# Patient Record
Sex: MALE | Race: WHITE | Employment: OTHER | URBAN - METROPOLITAN AREA
[De-identification: names, ages, dates, MRNs, and addresses within clinical notes are randomized per-mention and may not be internally consistent; named-entity substitution may affect disease eponyms.]

---

## 2018-05-07 ENCOUNTER — HOSPITAL ENCOUNTER (OUTPATIENT)
Dept: LAB | Age: 58
Discharge: HOME | End: 2018-05-07
Payer: MEDICARE

## 2018-05-07 ENCOUNTER — OFFICE VISIT (OUTPATIENT)
Dept: HEMATOLOGY | Age: 58
End: 2018-05-07

## 2018-05-07 VITALS
RESPIRATION RATE: 18 BRPM | HEIGHT: 70 IN | SYSTOLIC BLOOD PRESSURE: 144 MMHG | TEMPERATURE: 97.4 F | WEIGHT: 173 LBS | OXYGEN SATURATION: 93 % | DIASTOLIC BLOOD PRESSURE: 70 MMHG | BODY MASS INDEX: 24.77 KG/M2 | HEART RATE: 73 BPM

## 2018-05-07 DIAGNOSIS — B18.2 CHRONIC HEPATITIS C WITHOUT HEPATIC COMA (HCC): Primary | ICD-10-CM

## 2018-05-07 DIAGNOSIS — B18.2 CHRONIC HEPATITIS C WITHOUT HEPATIC COMA (HCC): ICD-10-CM

## 2018-05-07 PROBLEM — E11.9 TYPE II DIABETES MELLITUS (HCC): Status: ACTIVE | Noted: 2018-05-07

## 2018-05-07 PROBLEM — N18.6 ESRD (END STAGE RENAL DISEASE) ON DIALYSIS (HCC): Status: ACTIVE | Noted: 2018-05-07

## 2018-05-07 PROBLEM — I10 HYPERTENSION: Status: ACTIVE | Noted: 2018-05-07

## 2018-05-07 PROBLEM — Z99.2 ESRD (END STAGE RENAL DISEASE) ON DIALYSIS (HCC): Status: ACTIVE | Noted: 2018-05-07

## 2018-05-07 LAB
ALBUMIN SERPL-MCNC: 3.7 G/DL (ref 3.4–5)
ALBUMIN/GLOB SERPL: 1.1 {RATIO} (ref 0.8–1.7)
ALP SERPL-CCNC: 73 U/L (ref 45–117)
ALT SERPL-CCNC: 26 U/L (ref 16–61)
ANION GAP SERPL CALC-SCNC: 7 MMOL/L (ref 3–18)
AST SERPL-CCNC: 30 U/L (ref 15–37)
BASOPHILS # BLD: 0 K/UL (ref 0–0.06)
BASOPHILS NFR BLD: 1 % (ref 0–2)
BILIRUB DIRECT SERPL-MCNC: 0.1 MG/DL (ref 0–0.2)
BILIRUB SERPL-MCNC: 0.5 MG/DL (ref 0.2–1)
BUN SERPL-MCNC: 48 MG/DL (ref 7–18)
BUN/CREAT SERPL: 7 (ref 12–20)
CALCIUM SERPL-MCNC: 8.8 MG/DL (ref 8.5–10.1)
CHLORIDE SERPL-SCNC: 99 MMOL/L (ref 100–108)
CO2 SERPL-SCNC: 35 MMOL/L (ref 21–32)
CREAT SERPL-MCNC: 6.5 MG/DL (ref 0.6–1.3)
DIFFERENTIAL METHOD BLD: ABNORMAL
EOSINOPHIL # BLD: 0.4 K/UL (ref 0–0.4)
EOSINOPHIL NFR BLD: 7 % (ref 0–5)
ERYTHROCYTE [DISTWIDTH] IN BLOOD BY AUTOMATED COUNT: 15.4 % (ref 11.6–14.5)
FERRITIN SERPL-MCNC: 355 NG/ML (ref 8–388)
GLOBULIN SER CALC-MCNC: 3.4 G/DL (ref 2–4)
GLUCOSE SERPL-MCNC: 162 MG/DL (ref 74–99)
HCT VFR BLD AUTO: 35.7 % (ref 36–48)
HGB BLD-MCNC: 11.3 G/DL (ref 13–16)
INR PPP: 1 (ref 0.8–1.2)
IRON SATN MFR SERPL: 26 %
IRON SERPL-MCNC: 78 UG/DL (ref 50–175)
LYMPHOCYTES # BLD: 1.1 K/UL (ref 0.9–3.6)
LYMPHOCYTES NFR BLD: 19 % (ref 21–52)
MCH RBC QN AUTO: 30.7 PG (ref 24–34)
MCHC RBC AUTO-ENTMCNC: 31.7 G/DL (ref 31–37)
MCV RBC AUTO: 97 FL (ref 74–97)
MONOCYTES # BLD: 0.4 K/UL (ref 0.05–1.2)
MONOCYTES NFR BLD: 8 % (ref 3–10)
NEUTS SEG # BLD: 3.7 K/UL (ref 1.8–8)
NEUTS SEG NFR BLD: 65 % (ref 40–73)
PLATELET # BLD AUTO: 126 K/UL (ref 135–420)
PMV BLD AUTO: 9.5 FL (ref 9.2–11.8)
POTASSIUM SERPL-SCNC: 4.8 MMOL/L (ref 3.5–5.5)
PROT SERPL-MCNC: 7.1 G/DL (ref 6.4–8.2)
PROTHROMBIN TIME: 12.7 SEC (ref 11.5–15.2)
RBC # BLD AUTO: 3.68 M/UL (ref 4.7–5.5)
SODIUM SERPL-SCNC: 141 MMOL/L (ref 136–145)
TIBC SERPL-MCNC: 295 UG/DL (ref 250–450)
WBC # BLD AUTO: 5.7 K/UL (ref 4.6–13.2)

## 2018-05-07 PROCEDURE — 86708 HEPATITIS A ANTIBODY: CPT | Performed by: INTERNAL MEDICINE

## 2018-05-07 PROCEDURE — 87521 HEPATITIS C PROBE&RVRS TRNSC: CPT | Performed by: INTERNAL MEDICINE

## 2018-05-07 PROCEDURE — 80048 BASIC METABOLIC PNL TOTAL CA: CPT | Performed by: INTERNAL MEDICINE

## 2018-05-07 PROCEDURE — 83540 ASSAY OF IRON: CPT | Performed by: INTERNAL MEDICINE

## 2018-05-07 PROCEDURE — 36415 COLL VENOUS BLD VENIPUNCTURE: CPT | Performed by: INTERNAL MEDICINE

## 2018-05-07 PROCEDURE — 85025 COMPLETE CBC W/AUTO DIFF WBC: CPT | Performed by: INTERNAL MEDICINE

## 2018-05-07 PROCEDURE — 82728 ASSAY OF FERRITIN: CPT | Performed by: INTERNAL MEDICINE

## 2018-05-07 PROCEDURE — 80076 HEPATIC FUNCTION PANEL: CPT | Performed by: INTERNAL MEDICINE

## 2018-05-07 PROCEDURE — 87902 NFCT AGT GNTYP ALYS HEP C: CPT | Performed by: INTERNAL MEDICINE

## 2018-05-07 PROCEDURE — 85610 PROTHROMBIN TIME: CPT | Performed by: INTERNAL MEDICINE

## 2018-05-07 RX ORDER — GUAIFENESIN 100 MG/5ML
81 LIQUID (ML) ORAL DAILY
COMMUNITY
End: 2019-08-12

## 2018-05-07 RX ORDER — SEVELAMER CARBONATE 800 MG/1
TABLET, FILM COATED ORAL 3 TIMES DAILY
COMMUNITY
End: 2021-02-03

## 2018-05-07 RX ORDER — AMLODIPINE BESYLATE 5 MG/1
5 TABLET ORAL DAILY
COMMUNITY
End: 2021-02-03

## 2018-05-07 RX ORDER — ROSUVASTATIN CALCIUM 5 MG/1
TABLET, COATED ORAL
COMMUNITY
End: 2019-08-12

## 2018-05-07 RX ORDER — CARVEDILOL 25 MG/1
25 TABLET ORAL 2 TIMES DAILY WITH MEALS
COMMUNITY

## 2018-05-07 RX ORDER — CLONIDINE HYDROCHLORIDE 0.2 MG/1
TABLET ORAL 2 TIMES DAILY
COMMUNITY
End: 2021-02-03

## 2018-05-07 RX ORDER — FUROSEMIDE 40 MG/1
40 TABLET ORAL
COMMUNITY

## 2018-05-07 RX ORDER — LOSARTAN POTASSIUM 100 MG/1
50 TABLET ORAL DAILY
COMMUNITY
End: 2021-02-03

## 2018-05-07 RX ORDER — HYDROXYZINE PAMOATE 25 MG/1
25 CAPSULE ORAL
COMMUNITY
End: 2019-08-12

## 2018-05-07 NOTE — PROGRESS NOTES
Sarita Aguilar is a 59 y.o. male      1. Have you been to the ER, urgent care clinic or hospitalized since your last visit? NO.     2. Have you seen or consulted any other health care providers outside of the [address] [place] [address] since your last visit (Include any pap smears or colon screening)?  NO          Learning Assessment 5/7/2018   PRIMARY LEARNER Patient   BARRIERS PRIMARY LEARNER NONE   CO-LEARNER CAREGIVER No   PRIMARY LANGUAGE ENGLISH   LEARNER PREFERENCE PRIMARY LISTENING   ANSWERED BY PATIENT   RELATIONSHIP SELF

## 2018-05-07 NOTE — MR AVS SNAPSHOT
[address] [address] 
[phone] Patient: Florina Osler MRN: [id number] SIO:9/7/8972 Visit Information Date & Time Provider Department Dept. Phone Encounter #  
 5/7/2018 10:00 AM Jamila Muhammad MD [address] [place] [address] of  [facility] [id number] Upcoming Health Maintenance Date Due Hepatitis C Screening 1960 DTaP/Tdap/Td series (1 - Tdap) 1/4/1979 FOBT Q 1 YEAR AGE 50-75 1/4/2008 ZOSTER VACCINE AGE 60> 11/4/2017 Influenza Age 5 to Adult 8/1/2018 Allergies as of 5/7/2018  Review Complete On: 5/7/2018 By: Libby Solis No Known Allergies Current Immunizations  Never Reviewed No immunizations on file. Not reviewed this visit You Were Diagnosed With   
  
 Codes Comments Chronic hepatitis C without hepatic coma (HCC)    -  Primary ICD-10-CM: B18.2 ICD-9-CM: 070.54 Vitals BP Pulse Temp Resp Height(growth percentile) 144/70 (BP 1 Location: Right arm, BP Patient Position: Sitting) 73 97.4 °F (36.3 °C) (Tympanic) 18 5' 10\" (1.778 m) Weight(growth percentile) SpO2 BMI Smoking Status 173 lb (78.5 kg) 93% 24.82 kg/m2 Current Every Day Smoker BMI and BSA Data Body Mass Index Body Surface Area  
 24.82 kg/m 2 1.97 m 2 Preferred Pharmacy Pharmacy Name Phone [phone] Your Updated Medication List  
  
   
This list is accurate as of 5/7/18 11:03 AM.  Always use your most recent med list. amLODIPine 5 mg tablet Commonly known as:  Ilda Pall Take 5 mg by mouth daily. aspirin 81 mg chewable tablet Take 81 mg by mouth daily. carvedilol 25 mg tablet Commonly known as:  Carroll Staufferch Take 25 mg by mouth two (2) times daily (with meals). cloNIDine HCl 0.2 mg tablet Commonly known as:  CATAPRES  
 Take  by mouth two (2) times a day. DIALYVITE 800 WITH ZINC 15 PO Take  by mouth. hydrOXYzine pamoate 25 mg capsule Commonly known as:  VISTARIL Take 25 mg by mouth three (3) times daily as needed for Itching. JANUVIA 50 mg tablet Generic drug:  SITagliptin Take 50 mg by mouth daily. LASIX 40 mg tablet Generic drug:  furosemide Take  by mouth daily. losartan 100 mg tablet Commonly known as:  COZAAR Take 100 mg by mouth daily. RENVELA 800 mg Tab tab Generic drug:  sevelamer carbonate Take  by mouth three (3) times daily. rosuvastatin 5 mg tablet Commonly known as:  CRESTOR Take  by mouth nightly. To-Do List   
 05/07/2018 Lab:  CBC WITH AUTOMATED DIFF   
  
 05/07/2018 Lab:  FERRITIN   
  
 05/07/2018 Lab:  HCV RNA BY PARI QL,RFLX TO QT   
  
 05/07/2018 Lab:  HEP A AB, TOTAL   
  
 05/07/2018 Lab:  HEP C GENOTYPE   
  
 05/07/2018 Lab:  HEPATIC FUNCTION PANEL   
  
 05/07/2018 Lab:  IRON PROFILE   
  
 05/07/2018 Lab:  METABOLIC PANEL, BASIC   
  
 05/07/2018 Lab:  PROTHROMBIN TIME + INR   
  
 05/07/2018 Imaging:  US ABD LTD W ELASTOGRAPHY Introducing [facility] & HEALTH SERVICES! [employer] introduces [employer]-Exchange patient portal. Now you can access parts of your medical record, email your doctor's office, and request medication refills online. 1. In your internet browser, go to [email or web address]. [employer] 2. Click on the First Time User? Click Here link in the Sign In box. You will see the New Member Sign Up page. 3. Enter your Art-Exchange Access Code exactly as it appears below. You will not need to use this code after youve completed the sign-up process. If you do not sign up before the expiration date, you must request a new code. · Art-Exchange Access Code: [email or web address] Expires: 8/5/2018 11:03 AM 
 
4.  Enter the last four digits of your Social Security Number (xxxx) and Date of Birth (mm/dd/yyyy) as indicated and click Submit. You will be taken to the next sign-up page. 5. Create a Wellframe ID. This will be your Wellframe login ID and cannot be changed, so think of one that is secure and easy to remember. 6. Create a Wellframe password. You can change your password at any time. 7. Enter your Password Reset Question and Answer. This can be used at a later time if you forget your password. 8. Enter your e-mail address. You will receive e-mail notification when new information is available in [address] 9. Click Sign Up. You can now view and download portions of your medical record. 10. Click the Download Summary menu link to download a portable copy of your medical information. If you have questions, please visit the Frequently Asked Questions section of the Wellframe website. Remember, Wellframe is NOT to be used for urgent needs. For medical emergencies, dial 911. Now available from your iPhone and Android! Please provide this summary of care documentation to your next provider. Your primary care clinician is listed as Radha Lo. If you have any questions after today's visit, please call [phone].

## 2018-05-08 LAB — HAV AB SER QL IA: NEGATIVE

## 2018-05-10 LAB
HCV GENTYP SERPL NAA+PROBE: NORMAL
PLEASE NOTE, 550474: NORMAL

## 2018-05-11 LAB
HCV RNA SERPL NAA+PROBE-LOG IU: 7.69 HCV LOG 10IU/ML
HCV RNA SERPL PROBE AMP-ACNC: ABNORMAL HCVIU/ML
HCV RNA SERPL QL NAA+PROBE: POSITIVE

## 2018-06-25 ENCOUNTER — OFFICE VISIT (OUTPATIENT)
Dept: HEMATOLOGY | Age: 58
End: 2018-06-25

## 2018-06-25 VITALS
DIASTOLIC BLOOD PRESSURE: 82 MMHG | RESPIRATION RATE: 18 BRPM | HEART RATE: 73 BPM | SYSTOLIC BLOOD PRESSURE: 156 MMHG | TEMPERATURE: 96.8 F | HEIGHT: 70 IN | OXYGEN SATURATION: 90 %

## 2018-06-25 DIAGNOSIS — B18.2 CHRONIC HEPATITIS C WITHOUT HEPATIC COMA (HCC): Primary | ICD-10-CM

## 2018-06-25 NOTE — PROGRESS NOTES
[address] [address], [place], [address], [place], [place]       Sarah Bean, TAMMI Jin, AFSANEH Barr, [facility]   TAMMI Lr, TAMMI Poole [facility] [address] [address]    at [address], [address] Bridgett Srivastava  22.    [phone]    FAX: [address] [place] [address], [id number] [place] [address], [address]    [phone]    FAX: [phone]       Patient Care Team:  Eric Rojas MD as PCP - General (Family Practice)  Beti Howell MD (Internal Medicine)      Problem List  Date Reviewed: 5/13/2018          Codes Class Noted    Chronic hepatitis C [facility]) ICD-10-CM: B18.2  ICD-9-CM: 070.54  5/7/2018        ESRD (end stage renal disease) on dialysis [facility]) ICD-10-CM: N18.6, Z99.2  ICD-9-CM: 585.6, V45.11  5/7/2018        Type II diabetes mellitus ([facility] ICD-10-CM: E11.9  ICD-9-CM: 250.00  5/7/2018        Hypertension ICD-10-CM: I10  ICD-9-CM: 401.9  5/7/2018              Deborah Castro returns to the [address] for management of cirrhosis secondary to chronic HCV. The active problem list, all pertinent past medical history, medications, radiologic findings and laboratory findings related to the liver disorder were reviewed with the patient. The patient is a 59 y.o.  male who tested positive for chronic HCV in 2017 when he started dialysis      Since the last office appointment the patient has:  Been hospitalized twice for recurrent pleural effusion. An assessment of liver fibrosis with biopsy or elastography has not been performed. The patient has never received treatment for chronic HCV.       The patient notes fatigue,     The patient has not experienced pain in the right side over the liver,     The patient completes all daily activities without any functional limitations. ASSESSMENT AND PLAN:  Chronic HCV   Chronic HCV of unclear severity. Severity of the liver disease was assessed by laboratory studies  Liver transaminases are normal.  ALP is normal.  Liver function is normal.  The platelet count is depressed. Based upon laboratory studies the patient may have advanced liver disease or cirrhosis. The patient has not been treated for HCV. The patient has HCV genotype 2. Discussed the treatment alternatives. The SVR/cure rate for HCV now exceeds 97% without significant side effects for most patients with HCV. The specific treatment is dependent upon genotype, viral load and histology. The patient should be treated with Satira Edie (glecaprevir and piprentasvir) because of CKD. HOwever, if the patient is a potential renal transplant candidate he should undergo renal translant with an HCV positive kidney and then have HCV treated after the renal transplant. After the transplant he can be treated with Epclusa or Satira Edie. Recurrent pleural effusion  Unclear if this is from cirrhosis, fluid overload from ESRD or cirrhosis    Anemia   This is due to ESRD on dialysis    Thrombocytopenia   This is of unclear etiology. This may be secondary to cirrhosis. There is no evidence of overt bleeding. No treatment is required. The platelet count is adequate for the patient to undergo procedures without the need for platelet transfusion or platelet growth factors. Screening for Hepatocellular Carcinoma  HCC screening has recently been performed and does not suggest [facility] The next liver imaging study will be performed in 6/2018. Treatment of other medical problems in patients with chronic liver disease  There are no contraindications for the patient to take any medications that are necessary for treatment of other medical issues.     Counseling for alcohol in patients with chronic liver disease  The patient was counseled regarding alcohol consumption and the effect of alcohol on chronic liver disease. Vaccinations   Vaccination for viral hepatitis A is recommended since the patient has no serologic evidence of previous exposure or vaccination with immunity. Vaccination for viral hepatitis B is not needed. The patient has serologic evidence of prior exposure or vaccination with immunity. Routine vaccinations against other bacterial and viral agents can be performed as indicated. Annual flu vaccination should be administered if indicated. ALLERGIES  No Known Allergies    MEDICATIONS  Current Outpatient Prescriptions   Medication Sig    amLODIPine (NORVASC) 5 mg tablet Take 5 mg by mouth daily.  aspirin 81 mg chewable tablet Take 81 mg by mouth daily.  carvedilol (COREG) 25 mg tablet Take 25 mg by mouth two (2) times daily (with meals).  cloNIDine HCl (CATAPRES) 0.2 mg tablet Take  by mouth two (2) times a day.  vit B complx C/folic acid/zinc (DIALYVITE 800 WITH ZINC 15 PO) Take  by mouth.  furosemide (LASIX) 40 mg tablet Take  by mouth daily. Indications: three 40 mg tabs per day.  hydrOXYzine pamoate (VISTARIL) 25 mg capsule Take 25 mg by mouth three (3) times daily as needed for Itching.  losartan (COZAAR) 100 mg tablet Take 50 mg by mouth daily.  sevelamer carbonate (RENVELA) 800 mg tab tab Take  by mouth three (3) times daily.  rosuvastatin (CRESTOR) 5 mg tablet Take  by mouth nightly.  SITagliptin (JANUVIA) 50 mg tablet Take 25 mg by mouth daily. No current facility-administered medications for this visit. SYSTEM REVIEW NOT RELATED TO LIVER DISEASE OR REVIEWED ABOVE:  Constitution systems: Negative for fever, chills, weight gain, weight loss. Eyes: Negative for visual changes. ENT: Negative for sore throat, painful swallowing. Respiratory: Negative for cough, hemoptysis, SOB. Cardiology: Negative for chest pain, palpitations.   GI: Negative for constipation or diarrhea. : Negative for urinary frequency, dysuria, hematuria, nocturia. Skin: Negative for rash. Hematology: Negative for easy bruising, blood clots. Musculo-skelatal: Negative for back pain, muscle pain, weakness. Neurologic: Negative for headaches, dizziness, vertigo, memory problems not related to HE. Psychology: Negative for anxiety, depression. FAMILY HISTORY:  The father  of lung cancer. The mother  of head and neck cancer. A brother has cirrhosis form HCV and alcohol. SOCIAL HISTORY:  The patient is . The spouse has not been tested for HCV   The patient has 2 stepchildren, and 3 grandchildren. The patient stopped smoking tobacco in 2018. The patient has previously consumed alcohol in excess - up to 1/5 of alcohol per day. The patient has been abstinent from alcohol since 2017. The patient used to work as a . The patient has not worked since . The patient is currently receiving disability. PHYSICAL EXAMINATION:  Visit Vitals    /82 (BP 1 Location: Right arm, BP Patient Position: Sitting)    Pulse 73    Temp 96.8 °F (36 °C) (Tympanic)    Resp 18    Ht 5' 10\" (1.778 m)    SpO2 90%     General: No acute distress. Eyes: Sclera anicteric. ENT: No oral lesions. Thyroid normal.  Nodes: No adenopathy. Skin: No spider angiomata. No jaundice. No palmar erythema. Respiratory: Lungs clear to auscultation. Cardiovascular: Regular heart rate. No murmurs. No JVD. Abdomen: Soft non-tender. Liver size normal to percussion/palpation. Spleen not palpable. No obvious ascites. Extremities: No edema. No muscle wasting. No gross arthritic changes. Neurologic: Alert and oriented. Cranial nerves grossly intact. No asterixis.     LABORATORY STUDIES:  Liver [place] of [address] [place] [address] & Units 2018   WBC 4.6 - 13.2 K/uL 5.7   ANC 1.8 - 8.0 K/UL 3.7   HGB 13.0 - 16.0 g/dL 11.3 (L)    - 420 K/uL 126 (L)   INR 0.8 - 1.2   1.0   AST 15 - 37 U/L 30   ALT 16 - 61 U/L 26   Alk Phos 45 - 117 U/L 73   Bili, Total 0.2 - 1.0 MG/DL 0.5   Bili, Direct 0.0 - 0.2 MG/DL 0.1   Albumin 3.4 - 5.0 g/dL 3.7   BUN 7.0 - 18 MG/DL 48 (H)   Creat 0.6 - 1.3 MG/DL 6.50 (H)   Na 136 - 145 mmol/L 141   K 3.5 - 5.5 mmol/L 4.8   Cl 100 - 108 mmol/L 99 (L)   CO2 21 - 32 mmol/L 35 (H)   Glucose 74 - 99 mg/dL 162 (H)     SEROLOGIES:  2017. HBsurface antigen negative, anti-HBcore negative, anti-HBsurface positive    Serologies Latest Ref Rng & Units 2018   Hep A Ab, Total NEGATIVE   NEGATIVE   Hep C Genotype  2b   Ferritin 8 - 388 NG/   Iron % Saturation % 26     2018. HCV RNA Log 7.7 int U    LIVER HISTOLOGY:  Not available or performed    ENDOSCOPIC PROCEDURES:  Not available or performed    RADIOLOGY:  Not available or performed    OTHER TESTIN2017. ECHO heart. LVEF 60%. No TR. No PulHTN. Diastolic dysfunction. FOLLOW-UP:  All of the issues listed above in the Assessment and Plan were discussed with the patient. All questions were answered. The patient expressed a clear understanding of the above. [facility] in 4 weeks for elastography to review all data and determine the treatment plan.     Wen Salas MD  [address]  4 [facility], [address] [place] [address], [address]  [facility]

## 2018-06-25 NOTE — MR AVS SNAPSHOT
[address] [address] 
[phone] Patient: Jaylin Urbina MRN: [id number] [facility]:[birth year] Visit Information Date & Time Provider Department Dept. Phone Encounter #  
 6/25/2018 11:15 AM MD Ricki Herrera 13 of [facility] [phone] Upcoming Health Maintenance Date Due HEMOGLOBIN A1C Q6M 1960 LIPID PANEL Q1 1960 FOOT EXAM Q1 1/4/1968 MICROALBUMIN Q1 1/4/1968 EYE EXAM RETINAL OR DILATED Q1 1/4/1968 Pneumococcal 19-64 Highest Risk (1 of 3 - PCV13) 1/4/1977 DTaP/Tdap/Td series (1 - Tdap) 1/4/1979 FOBT Q 1 YEAR AGE 50-75 1/4/2008 ZOSTER VACCINE AGE 60> 11/4/2017 MEDICARE YEARLY EXAM 5/7/2018 Influenza Age 5 to Adult 8/1/2018 Allergies as of 6/25/2018  Review Complete On: 6/25/2018 By: Paulina Broussard No Known Allergies Current Immunizations  Never Reviewed No immunizations on file. Not reviewed this visit Vitals BP Pulse Temp Resp Height(growth percentile) SpO2  
 156/82 (BP 1 Location: Right arm, BP Patient Position: Sitting) 73 96.8 °F (36 °C) (Tympanic) 18 5' 10\" (1.778 m) 90% Smoking Status Former Smoker Preferred Pharmacy Pharmacy Name Phone [facility] Carmen Olson [phone] Your Updated Medication List  
  
   
This list is accurate as of 6/25/18 11:59 AM.  Always use your most recent med list. amLODIPine 5 mg tablet Commonly known as:  Carlus Sary Take 5 mg by mouth daily. aspirin 81 mg chewable tablet Take 81 mg by mouth daily. carvedilol 25 mg tablet Commonly known as:  Gloria Peck Take 25 mg by mouth two (2) times daily (with meals). cloNIDine HCl 0.2 mg tablet Commonly known as:  CATAPRES Take  by mouth two (2) times a day.   
  
 DIALYVITE 800 WITH ZINC 15 PO  
 Take  by mouth. hydrOXYzine pamoate 25 mg capsule Commonly known as:  VISTARIL Take 25 mg by mouth three (3) times daily as needed for Itching. JANUVIA 50 mg tablet Generic drug:  SITagliptin Take 25 mg by mouth daily. LASIX 40 mg tablet Generic drug:  furosemide Take  by mouth daily. Indications: three 40 mg tabs per day. losartan 100 mg tablet Commonly known as:  COZAAR Take 50 mg by mouth daily. RENVELA 800 mg Tab tab Generic drug:  sevelamer carbonate Take  by mouth three (3) times daily. rosuvastatin 5 mg tablet Commonly known as:  CRESTOR Take  by mouth nightly. Introducing [facility] & HEALTH SERVICES! [address] introduces [employer] patient portal. Now you can access parts of your medical record, email your doctor's office, and request medication refills online. 1. In your internet browser, go to [email or web address]. [employer] 2. Click on the First Time User? Click Here link in the Sign In box. You will see the New Member Sign Up page. 3. Enter your [employer] Access Code exactly as it appears below. You will not need to use this code after youve completed the sign-up process. If you do not sign up before the expiration date, you must request a new code. · [employer] Access Code: [email or web address] Expires: 8/5/2018 11:03 AM 
 
4. Enter the last four digits of your Social Security Number (xxxx) and Date of Birth (mm/dd/yyyy) as indicated and click Submit. You will be taken to the next sign-up page. 5. Create a [employer] ID. This will be your [employer] login ID and cannot be changed, so think of one that is secure and easy to remember. 6. Create a [employer] password. You can change your password at any time. 7. Enter your Password Reset Question and Answer. This can be used at a later time if you forget your password. 8. Enter your e-mail address. You will receive e-mail notification when new information is available in [address] 9. Click Sign Up. You can now view and download portions of your medical record. 10. Click the Download Summary menu link to download a portable copy of your medical information. If you have questions, please visit the Frequently Asked Questions section of the uStudio website. Remember, uStudio is NOT to be used for urgent needs. For medical emergencies, dial 911. Now available from your iPhone and Android! Please provide this summary of care documentation to your next provider. Your primary care clinician is listed as Michelle Colon. If you have any questions after today's visit, please call [phone].

## 2018-06-25 NOTE — PROGRESS NOTES
Anali Pemberton is a 59 y.o. male      1. Have you been to the ER, urgent care clinic or hospitalized since your last visit? YES. Patient has been to [facility] since last visit for fluid in lungs and pneumonia. 2. Have you seen or consulted any other health care providers outside of the [address] [place] [address] since your last visit (Include any pap smears or colon screening)? YES    Patient has been to his orthopedic provider at [place].        Learning Assessment 5/7/2018   PRIMARY LEARNER Patient   BARRIERS PRIMARY LEARNER NONE   CO-LEARNER CAREGIVER No   PRIMARY LANGUAGE ENGLISH   LEARNER PREFERENCE PRIMARY LISTENING   ANSWERED BY PATIENT   RELATIONSHIP SELF

## 2018-08-01 ENCOUNTER — HOSPITAL ENCOUNTER (OUTPATIENT)
Dept: ULTRASOUND IMAGING | Age: 58
Discharge: HOME | End: 2018-08-01
Attending: INTERNAL MEDICINE
Payer: MEDICARE

## 2018-08-01 DIAGNOSIS — B18.2 CHRONIC HEPATITIS C WITHOUT HEPATIC COMA (HCC): ICD-10-CM

## 2018-08-01 PROCEDURE — 0346T US ABD LTD W ELASTOGRAPHY: CPT

## 2018-08-22 ENCOUNTER — TELEPHONE (OUTPATIENT)
Dept: HEMATOLOGY | Age: 58
End: 2018-08-22

## 2018-08-22 NOTE — TELEPHONE ENCOUNTER
Received phone call from Dr. Michele Aguilar with [facility] renal transplant team who is evaluating patient for renal transplant candidacy. Patient informed Dr. Michele Aguilar he is a patient of Dr. Ginny Daniel and reported he recently had elastography. She inquired about results. Reviewed results and notified Dr. Michele Aguilar patient has cirrhosis. Patient and girlfriend informed Dr. Michele Aguilar they are unable to pursue transplant evaluation at [facility], where Dr. Barry Gant generally refers patients. Advised Dr. Michele Aguilar that patient has a f/u appointment next week with Dr. Barry Gant and can discuss potential transfer of care to [facility] to pursue listing for liver and kidney transplant. Dr. Michele Aguilar indicated she would contact Dr. Barry Gant directly to review patient situation. Faxed requested records to Dr. Michele Aguilar (f: [phone]).

## 2018-08-29 ENCOUNTER — OFFICE VISIT (OUTPATIENT)
Dept: HEMATOLOGY | Age: 58
End: 2018-08-29

## 2018-08-29 VITALS
WEIGHT: 136 LBS | OXYGEN SATURATION: 96 % | HEART RATE: 70 BPM | HEIGHT: 70 IN | SYSTOLIC BLOOD PRESSURE: 160 MMHG | RESPIRATION RATE: 18 BRPM | BODY MASS INDEX: 19.47 KG/M2 | DIASTOLIC BLOOD PRESSURE: 75 MMHG | TEMPERATURE: 96.5 F

## 2018-08-29 DIAGNOSIS — R16.0 LIVER MASS: Primary | ICD-10-CM

## 2018-08-29 NOTE — MR AVS SNAPSHOT
[address] [address] 
[phone] Patient: Maurilio Altman MRN: [id number] OEN:[birth year] Visit Information Date & Time Provider Department Dept. Phone Encounter #  
 8/29/2018 12:00 PM [facility]  [address] [id number] Follow-up Instructions Return in about 4 weeks (around 9/26/2018) for MLS. Upcoming Health Maintenance Date Due HEMOGLOBIN A1C Q6M 1960 LIPID PANEL Q1 1958 FOOT EXAM Q1 1/4/1968 MICROALBUMIN Q1 1/4/1968 EYE EXAM RETINAL OR DILATED Q1 1/4/1968 Pneumococcal 19-64 Highest Risk (1 of 3 - PCV13) 1/4/1977 DTaP/Tdap/Td series (1 - Tdap) 1/4/1979 FOBT Q 1 YEAR AGE 50-75 1/4/2008 ZOSTER VACCINE AGE 60> 11/4/2017 MEDICARE YEARLY EXAM 5/7/2018 Influenza Age 5 to Adult 8/1/2018 Allergies as of 8/29/2018  Review Complete On: 8/29/2018 By: Clarisse Frost No Known Allergies Current Immunizations  Never Reviewed No immunizations on file. Not reviewed this visit You Were Diagnosed With   
  
 Codes Comments Liver mass    -  Primary ICD-10-CM: R16.0 ICD-9-CM: 573.9 Vitals BP Pulse Temp Resp Height(growth percentile) 160/75 (BP 1 Location: Right arm, BP Patient Position: Sitting) 70 96.5 °F (35.8 °C) (Tympanic) 18 5' 10\" (1.778 m) Weight(growth percentile) SpO2 BMI Smoking Status 136 lb (61.7 kg) 96% 19.51 kg/m2 Current Every Day Smoker BMI and BSA Data Body Mass Index Body Surface Area  
 19.51 kg/m 2 1.75 m 2 Preferred Pharmacy Pharmacy Name Phone [facility] [place] [phone] Your Updated Medication List  
  
   
This list is accurate as of 8/29/18 12:36 PM.  Always use your most recent med list. amLODIPine 5 mg tablet Commonly known as:  Lillette Cabot Take 5 mg by mouth daily. aspirin 81 mg chewable tablet Take 81 mg by mouth daily. carvedilol 25 mg tablet Commonly known as:  Deondrehemanth Zahra Take 25 mg by mouth two (2) times daily (with meals). cloNIDine HCl 0.2 mg tablet Commonly known as:  CATAPRES Take  by mouth two (2) times a day. DIALYVITE 800 WITH ZINC 15 PO Take  by mouth. hydrOXYzine pamoate 25 mg capsule Commonly known as:  VISTARIL Take 25 mg by mouth three (3) times daily as needed for Itching. JANUVIA 50 mg tablet Generic drug:  SITagliptin Take 25 mg by mouth daily. LASIX 40 mg tablet Generic drug:  furosemide Take  by mouth daily. Indications: three 40 mg tabs per day. losartan 100 mg tablet Commonly known as:  COZAAR Take 50 mg by mouth daily. RENVELA 800 mg Tab tab Generic drug:  sevelamer carbonate Take  by mouth three (3) times daily. rosuvastatin 5 mg tablet Commonly known as:  CRESTOR Take  by mouth nightly. Follow-up Instructions Return in about 4 weeks (around 9/26/2018) for MLS. To-Do List   
 09/28/2018 Imaging:  MRI ABD W WO CONT Introducing [facility]! [facility] introduces School Admissions patient portal. Now you can access parts of your medical record, email your doctor's office, and request medication refills online. 1. In your internet browser, go to [email or web address]. [employer] 2. Click on the First Time User? Click Here link in the Sign In box. You will see the New Member Sign Up page. 3. Enter your School Admissions Access Code exactly as it appears below. You will not need to use this code after youve completed the sign-up process. If you do not sign up before the expiration date, you must request a new code. · School Admissions Access Code: [email or web address] Expires: 11/27/2018 12:36 PM 
 
4.  Enter the last four digits of your Social Security Number (xxxx) and Date of Birth (mm/dd/yyyy) as indicated and click Submit. You will be taken to the next sign-up page. 5. Create a Kerlink ID. This will be your Kerlink login ID and cannot be changed, so think of one that is secure and easy to remember. 6. Create a Kerlink password. You can change your password at any time. 7. Enter your Password Reset Question and Answer. This can be used at a later time if you forget your password. 8. Enter your e-mail address. You will receive e-mail notification when new information is available in [address] 9. Click Sign Up. You can now view and download portions of your medical record. 10. Click the Download Summary menu link to download a portable copy of your medical information. If you have questions, please visit the Frequently Asked Questions section of the Kerlink website. Remember, Kerlink is NOT to be used for urgent needs. For medical emergencies, dial 911. Now available from your iPhone and Android! Please provide this summary of care documentation to your next provider. Your primary care clinician is listed as Phys Other. If you have any questions after today's visit, please call [phone].

## 2018-08-29 NOTE — PROGRESS NOTES
245 [facility] [address], [place], [address], [place], [place]       Moises Woody, TAMMI Weir, PALANDON Katz, [facility]   TAMMI Edwards NP [address] [facility] [address]    at [address]    217 [facility], [address] [place] [address], Tobyi  22.    [phone]    FAX: [address] [place] [address] Avenue    [address], [address]    [phone]    FAX: [phone]       Patient Care Team:  Other, MD Mary as PCP - Ellen Sanchez MD (Internal Medicine)      Problem List  Date Reviewed: 12/24/2018          Codes Class Noted    Chronic hepatitis C ([facility] ICD-10-CM: B18.2  ICD-9-CM: 070.54  5/7/2018        ESRD (end stage renal disease) on dialysis [facility]) ICD-10-CM: N18.6, Z99.2  ICD-9-CM: 585.6, V45.11  5/7/2018        Type II diabetes mellitus ([facility] ICD-10-CM: E11.9  ICD-9-CM: 250.00  5/7/2018        Hypertension ICD-10-CM: I10  ICD-9-CM: 401.9  5/7/2018              Bonita Severance returns to the [address] for management of chronic HCV. The active problem list, all pertinent past medical history, medications, radiologic findings and laboratory findings related to the liver disorder were reviewed with the patient. The patient is a 59 y.o.  male who tested positive for chronic HCV in 2017 when he started dialysis      The most recent imaging of the liver was Ultrasound performed in 8/2018. Results suggest cirrhosis. A hyperecholic lesion was present in the right lobe measuring 0.4 cm and a cyst in the right lobe measuring 7 x 6 cm. Assessment of liver fibrosis was performed with sheer wave elastography at [facility] in 8/2018. The result was 19.6 kPa which correlates cirrhosis. The patient has developed pleural effusions.   It is unclear if this is due to cirrhosis or ESRD and fluid overload. There are no other complications of cirrhosis    The patient has never received treatment for chronic HCV. The patient notes fatigue,     The patient has not experienced pain in the right side over the liver,     The patient completes all daily activities without any functional limitations. ASSESSMENT AND PLAN:  Cirrhosis  Cirrhosis is secondary to chronic HCV. The patient is on Coumadin and so the CTP and the MELD score are falsely elevated. The CTP is 5. Child class A. The MELD score is 18. All of the increase in the MELD score is dure to ESRD. Chronic HCV   Chronic HCV with cirrhrosis. Severity of the liver disease was assessed by laboratory studies and elastography  Liver transaminases are normal.  ALP is normal.  Liver function is normal.  The platelet count is depressed. Based upon laboratory studies the patient may have advanced liver disease or cirrhosis. The patient has not been treated for HCV. The patient has HCV genotype 2. We need to determine if the patient should undergo renal transplant, combined renal-liver transplant or be treated for HCV now or after either of the transplants. Will need to discuss with Dr Sri Pritchett at [facility]    The patient can be treated with Hong Spinner (glecaprevir and piprentasvir) prior to transplant. Liver mass  This is hyperechoic on US which suggests this is hemangioma. Will obtain MRI. Recurrent pleural effusion  Unclear if this is from cirrhosis, fluid overload from ESRD or cirrhosis    Anemia   This is due to ESRD on dialysis    Thrombocytopenia   This is of unclear etiology. This may be secondary to cirrhosis. There is no evidence of overt bleeding. No treatment is required. The platelet count is adequate for the patient to undergo procedures without the need for platelet transfusion or platelet growth factors.     Screening for Hepatocellular Carcinoma  HCC screening has recently been performed and does not suggest [facility] The next liver imaging study will be performed in 6/2018. Treatment of other medical problems in patients with chronic liver disease  There are no contraindications for the patient to take any medications that are necessary for treatment of other medical issues. Counseling for alcohol in patients with chronic liver disease  The patient was counseled regarding alcohol consumption and the effect of alcohol on chronic liver disease. Vaccinations   Vaccination for viral hepatitis A is recommended since the patient has no serologic evidence of previous exposure or vaccination with immunity. Vaccination for viral hepatitis B is not needed. The patient has serologic evidence of prior exposure or vaccination with immunity. Routine vaccinations against other bacterial and viral agents can be performed as indicated. Annual flu vaccination should be administered if indicated. ALLERGIES  No Known Allergies    MEDICATIONS  Current Outpatient Medications   Medication Sig    amLODIPine (NORVASC) 5 mg tablet Take 5 mg by mouth daily.  aspirin 81 mg chewable tablet Take 81 mg by mouth daily.  carvedilol (COREG) 25 mg tablet Take 25 mg by mouth two (2) times daily (with meals).  cloNIDine HCl (CATAPRES) 0.2 mg tablet Take  by mouth two (2) times a day.  vit B complx C/folic acid/zinc (DIALYVITE 800 WITH ZINC 15 PO) Take  by mouth.  furosemide (LASIX) 40 mg tablet Take  by mouth daily. Indications: three 40 mg tabs per day.  hydrOXYzine pamoate (VISTARIL) 25 mg capsule Take 25 mg by mouth three (3) times daily as needed for Itching.  losartan (COZAAR) 100 mg tablet Take 50 mg by mouth daily.  sevelamer carbonate (RENVELA) 800 mg tab tab Take  by mouth three (3) times daily.  rosuvastatin (CRESTOR) 5 mg tablet Take  by mouth nightly.  SITagliptin (JANUVIA) 50 mg tablet Take 25 mg by mouth daily.      No current facility-administered medications for this visit. SYSTEM REVIEW NOT RELATED TO LIVER DISEASE OR REVIEWED ABOVE:  Constitution systems: Negative for fever, chills, weight gain, weight loss. Eyes: Negative for visual changes. ENT: Negative for sore throat, painful swallowing. Respiratory: Negative for cough, hemoptysis, SOB. Cardiology: Negative for chest pain, palpitations. GI:  Negative for constipation or diarrhea. : Negative for urinary frequency, dysuria, hematuria, nocturia. Skin: Negative for rash. Hematology: Negative for easy bruising, blood clots. Musculo-skelatal: Negative for back pain, muscle pain, weakness. Neurologic: Negative for headaches, dizziness, vertigo, memory problems not related to HE. Psychology: Negative for anxiety, depression. FAMILY HISTORY:  The father  of lung cancer. The mother  of head and neck cancer. A brother has cirrhosis form HCV and alcohol. SOCIAL HISTORY:  The patient is . The spouse has not been tested for HCV   The patient has 2 stepchildren, and 3 grandchildren. The patient stopped smoking tobacco in 2018. The patient has previously consumed alcohol in excess - up to 1/5 of alcohol per day. The patient has been abstinent from alcohol since 2017. The patient used to work as a . The patient has not worked since . The patient is currently receiving disability. PHYSICAL EXAMINATION:  Visit Vitals  /75 (BP 1 Location: Right arm, BP Patient Position: Sitting)   Pulse 70   Temp 96.5 °F (35.8 °C) (Tympanic)   Resp 18   Ht 5' 10\" (1.778 m)   Wt 136 lb (61.7 kg)   SpO2 96%   BMI 19.51 kg/m²     General: No acute distress. Eyes: Sclera anicteric. ENT: No oral lesions. Thyroid normal.  Nodes: No adenopathy. Skin: No spider angiomata. No jaundice. No palmar erythema. Respiratory: Lungs clear to auscultation. Cardiovascular: Regular heart rate. No murmurs. No JVD.   Abdomen: Soft non-tender. Liver size normal to percussion/palpation. Spleen not palpable. No obvious ascites. Extremities: No edema. No muscle wasting. No gross arthritic changes. Neurologic: Alert and oriented. Cranial nerves grossly intact. No asterixis. LABORATORY STUDIES:  Liver [place] of [address] [place] [address] & Units 2018   WBC 4.6 - 13.2 K/uL 5.7   ANC 1.8 - 8.0 K/UL 3.7   HGB 13.0 - 16.0 g/dL 11.3 (L)    - 420 K/uL 126 (L)   INR 0.8 - 1.2   1.0   AST 15 - 37 U/L 30   ALT 16 - 61 U/L 26   Alk Phos 45 - 117 U/L 73   Bili, Total 0.2 - 1.0 MG/DL 0.5   Bili, Direct 0.0 - 0.2 MG/DL 0.1   Albumin 3.4 - 5.0 g/dL 3.7   BUN 7.0 - 18 MG/DL 48 (H)   Creat 0.6 - 1.3 MG/DL 6.50 (H)   Na 136 - 145 mmol/L 141   K 3.5 - 5.5 mmol/L 4.8   Cl 100 - 108 mmol/L 99 (L)   CO2 21 - 32 mmol/L 35 (H)   Glucose 74 - 99 mg/dL 162 (H)     SEROLOGIES:  2017. HBsurface antigen negative, anti-HBcore negative, anti-HBsurface positive    Serologies Latest Ref Rng & Units 2018   Hep A Ab, Total NEGATIVE   NEGATIVE   Hep C Genotype  2b   Ferritin 8 - 388 NG/   Iron % Saturation % 26     2018. HCV RNA Log 7.7 int U    LIVER HISTOLOGY:  Not available or performed    ENDOSCOPIC PROCEDURES:  Not available or performed    RADIOLOGY:  Not available or performed    OTHER TESTIN2017. ECHO heart. LVEF 60%. No TR. No PulHTN. Diastolic dysfunction. FOLLOW-UP:  All of the issues listed above in the Assessment and Plan were discussed with the patient. All questions were answered. The patient expressed a clear understanding of the above. [facility] 87 in 4 weeks for elastography to review all data and determine the treatment plan.     Cecilia Amaya [place]  [address]  4 [facility], [address] [place] [address] Roma Reynolds, [address]  12 [facility]

## 2018-08-29 NOTE — PROGRESS NOTES
Virgil Curtis is a 59 y.o. male      1. Have you been to the ER, urgent care clinic or hospitalized since your last visit? NO.     2. Have you seen or consulted any other health care providers outside of the [address] [place] [address] since your last visit (Include any pap smears or colon screening)? YES    Patient has been to [facility] since last visit.            Learning Assessment 5/7/2018   PRIMARY LEARNER Patient   BARRIERS PRIMARY LEARNER NONE   CO-LEARNER CAREGIVER No   PRIMARY LANGUAGE ENGLISH   LEARNER PREFERENCE PRIMARY LISTENING   ANSWERED BY PATIENT   RELATIONSHIP SELF

## 2018-12-11 ENCOUNTER — TELEPHONE (OUTPATIENT)
Dept: HEMATOLOGY | Age: 58
End: 2018-12-11

## 2018-12-11 NOTE — TELEPHONE ENCOUNTER
----- Message from [facility] sent at 12/3/2018 10:36 AM EST -----  Regarding: Dr. Ruthie Freeman: [phone]  Ciarra Kearns, wanted to know if the doctor could send the order for the MRI at [facility] in [place]? Leave a detailed vm please.

## 2018-12-11 NOTE — TELEPHONE ENCOUNTER
HENNAI--    I have been unable to reach patient, please check with him to see if MRI has been done or where he is going to have it done if you are able to reach him. Oswaldo Wu He is also not scheduled for f/u ? Is he suppose to return ?

## 2018-12-28 NOTE — PROGRESS NOTES
[address] Alexis Daley MD, Vincent Wilson, [place], [place]       Michael Guillory, AFSANEH Iraheta, [facility]   Darcie Delaney, TAMMI [facility] 136    at [address], [address] Bridgett Srivastava  22.    [phone]    FAX: [address] [place] [address], [address] [place] [address],[address]    [address] [place] [address] Street: [phone]         Patient Care Team:  Ignacio Tierney MD as PCP - General (Family Practice)  Brenden Arciniega MD (Internal Medicine)      Problem List  Date Reviewed: 5/7/2018          Codes Class Noted    Chronic hepatitis C [facility]) ICD-10-CM: B18.2  ICD-9-CM: 070.54  5/7/2018        ESRD (end stage renal disease) on dialysis [facility]) ICD-10-CM: N18.6, Z99.2  ICD-9-CM: 585.6, V45.11  5/7/2018        Type II diabetes mellitus ([facility] ICD-10-CM: E11.9  ICD-9-CM: 250.00  5/7/2018        Hypertension ICD-10-CM: I10  ICD-9-CM: 401.9  5/7/2018                The physicians listed above have asked me to see Abdulkadir Hahn in consultation regarding chronic HCV and its management. All medical records sent by the referring physicians were reviewed     The patient is a 59 y.o.  male who tested positive for chronic HCV in 2017 when he started dialysis      Risk factors for acquiring HCV are inhaling cocaine in 1980s. There was no history of acute incteric hepatitis at the time of these risk factors. Imaging of the liver was either not performed or the results are not available to me. An assessment of liver fibrosis with biopsy or elastography has not been performed. The patient has never received treatment for chronic HCV.       The most recent laboratory studies indicate that the liver transaminases are normal and the platelet count is depressed. The patient notes fatigue,     The patient has not experienced pain in the right side over the liver,     The patient completes all daily activities without any functional limitations. ALLERGIES  No Known Allergies    MEDICATIONS  Current Outpatient Prescriptions   Medication Sig    amLODIPine (NORVASC) 5 mg tablet Take 5 mg by mouth daily.  aspirin 81 mg chewable tablet Take 81 mg by mouth daily.  carvedilol (COREG) 25 mg tablet Take 25 mg by mouth two (2) times daily (with meals).  cloNIDine HCl (CATAPRES) 0.2 mg tablet Take  by mouth two (2) times a day.  vit B complx C/folic acid/zinc (DIALYVITE 800 WITH ZINC 15 PO) Take  by mouth.  furosemide (LASIX) 40 mg tablet Take  by mouth daily.  hydrOXYzine pamoate (VISTARIL) 25 mg capsule Take 25 mg by mouth three (3) times daily as needed for Itching.  losartan (COZAAR) 100 mg tablet Take 100 mg by mouth daily.  sevelamer carbonate (RENVELA) 800 mg tab tab Take  by mouth three (3) times daily.  rosuvastatin (CRESTOR) 5 mg tablet Take  by mouth nightly.  SITagliptin (JANUVIA) 50 mg tablet Take 50 mg by mouth daily. No current facility-administered medications for this visit. SYSTEM REVIEW NOT RELATED TO LIVER DISEASE OR REVIEWED ABOVE:  Constitution systems: Negative for fever, chills, weight gain, weight loss. Eyes: Negative for visual changes. ENT: Negative for sore throat, painful swallowing. Respiratory: Negative for cough, hemoptysis, SOB. Cardiology: Negative for chest pain, palpitations. GI:  Negative for constipation or diarrhea. : Negative for urinary frequency, dysuria, hematuria, nocturia. Skin: Negative for rash. Hematology: Negative for easy bruising, blood clots. Musculo-skelatal: Negative for back pain, muscle pain, weakness. Neurologic: Negative for headaches, dizziness, vertigo, memory problems not related to HE.   Psychology: Negative for Topical Sulfur Applications Pregnancy And Lactation Text: This medication is Pregnancy Category C and has an unknown safety profile during pregnancy. It is unknown if this topical medication is excreted in breast milk. anxiety, depression. FAMILY HISTORY:  The father  of lung cancer. The mother  of head and neck cancer. A brother has cirrhosis form HCV and alcohol. SOCIAL HISTORY:  The patient is . The spouse has not been tested for HCV   The patient has 2 stepchildren, and 3 grandchildren. The patient currently smokes 1 pack of tobacco daily. The patient has previously consumed alcohol in excess - up to 1/5 of alcohol per day. The patient has been abstinent from alcohol since 2017. The patient used to work . The patient has not worked since . The patient is currently receiving disability. PHYSICAL EXAMINATION:  Visit Vitals    /70 (BP 1 Location: Right arm, BP Patient Position: Sitting)    Pulse 73    Temp 97.4 °F (36.3 °C) (Tympanic)    Resp 18    Ht 5' 10\" (1.778 m)    Wt 173 lb (78.5 kg)    SpO2 93%    BMI 24.82 kg/m2     General: No acute distress. Eyes: Sclera anicteric. ENT: No oral lesions. Thyroid normal.  Nodes: No adenopathy. Skin: No spider angiomata. No jaundice. No palmar erythema. Respiratory: Lungs clear to auscultation. Cardiovascular: Regular heart rate. No murmurs. No JVD. Abdomen: Soft non-tender. Liver size normal to percussion/palpation. Spleen not palpable. No obvious ascites. Extremities: No edema. No muscle wasting. No gross arthritic changes. Neurologic: Alert and oriented. Cranial nerves grossly intact. No asterixis.     LABORATORY STUDIES:  Liver [place] of [address] & Units 2018   WBC 4.6 - 13.2 K/uL 5.7   ANC 1.8 - 8.0 K/UL 3.7   HGB 13.0 - 16.0 g/dL 11.3 (L)    - 420 K/uL 126 (L)   INR 0.8 - 1.2   1.0   AST 15 - 37 U/L 30   ALT 16 - 61 U/L 26   Alk Phos 45 - 117 U/L 73   Bili, Total 0.2 - 1.0 MG/DL 0.5   Bili, Direct 0.0 - 0.2 MG/DL 0.1   Albumin 3.4 - 5.0 g/dL 3.7   BUN 7.0 - 18 MG/DL 48 (H)   Creat 0.6 - 1.3 MG/DL 6.50 (H)   Na 136 - 145 mmol/L 141   K 3.5 - 5.5 Hydroquinone Pregnancy And Lactation Text: This medication has not been assigned a Pregnancy Risk Category but animal studies failed to show danger with the topical medication. It is unknown if the medication is excreted in breast milk. mmol/L 4.8   Cl 100 - 108 mmol/L 99 (L)   CO2 21 - 32 mmol/L 35 (H)   Glucose 74 - 99 mg/dL 162 (H)     SEROLOGIES:  11/2017. HBsurface antigen negative, anti-HBcore negative, anti-HBsurface positive    Serologies Latest Ref Rng & Units 5/7/2018   Hep A Ab, Total NEGATIVE   NEGATIVE   Hep C Genotype  2b   Ferritin 8 - 388 NG/   Iron % Saturation % 26     5/2018. HCV RNA Log 7.7 int U    LIVER HISTOLOGY:  Not available or performed    ENDOSCOPIC PROCEDURES:  Not available or performed    RADIOLOGY:  Not available or performed    OTHER TESTING:  Not available or performed    ASSESSMENT AND PLAN:  Chronic HCV of unclear severity. Have performed laboratory testing to monitor liver function and degree of liver injury. This included BMP, hepatic panel, CBC with platelet count,     Liver function is normal.  The platelet count is depressed. Based upon laboratory studies the patient may have cirrhosis. Will perform and/or review results of HCV viral load and HCV genotype to define the specific treatment and duration of treatment that will be required. Will perform serologic and virologic studies to assess for other causes of chronic liver disease. Will perform imaging of the liver with ultrasound. The need to assess liver fibrosis was discussed. Sheer wave elastography can assess liver fibrosis and determine if a patient has advanced fibrosis or cirrhosis without the need for liver biopsy. Sheer wave elastography is now available at [facility]. This will be scheduled. The patient has not previously been treated for HCV. The patient has HCV genotype 2. Discussed the treatment alternatives. The SVR/cure rate for HCV now exceeds 90% with just oral anti-viral therapy and no interferon injections or significant side effects for most patients with HCV. The specific treatment is dependent upon genotype, viral load and histology.       The patient could be treated with Humira Pregnancy And Lactation Text: This medication is Pregnancy Category B and is considered safe during pregnancy. It is unknown if this medication is excreted in breast milk. Mavyret (glecaprevir and piprentasvir) or Epclusa (sofosbuvir and valpitasvir). The SVR/cure rate for is over 99%. Also discussed enrolling in the Target HCV database which is nationwide program into which the results of clinical treatment of HCV is reported. The main issue here is whether the patient is a candidate for a renal transplant or not. IF the patient is a candidate for a renal transplant I would suggest that he proceed with the renal tranplant and then treat HCV after the renal transplant. The low platelet count suggests that the patient may have cirrhosis. IF so, I would do EGD and if there are no varices the patient has sufficient liver reserve and minimal risk to proceed with renal transplant and not a combined renal and liver transplant. The patient was directed to continue all current medications at the current dosages. There are no contraindications for the patient to take any medications that are necessary for treatment of other medical issues. The patient was counseled regarding alcohol consumption. Vaccination for viral hepatitis A is recommended since the patient has no serologic evidence of previous exposure or vaccination with immunity. Vaccination for viral hepatitis B is not needed. The patient has serologic evidence of prior exposure or vaccination with immunity. [facility]. screening will be performed. AFP was ordered today and ultrasound will be scheduled. This will be repeated every 6 months if the patient is found to have cirrhosis. Thrombocytopenia is probably secondary to cirrhosis from chronic HCV. No treatment necessary. Anemia is secondary to ESRD. All of the above issues were discussed with the patient. All questions were answered. The patient expressed a clear understanding of the above. [address] [place] [address] in 4 weeks to review all data and determine the treatment plan.     Nathan Anthony MD  Liver [place] Methotrexate Pregnancy And Lactation Text: This medication is Pregnancy Category X and is known to cause fetal harm. This medication is excreted in breast milk. of [place]    [facility], [address] [facility], [address]  [phone] Glycopyrrolate Counseling:  I discussed with the patient the risks of glycopyrrolate including but not limited to skin rash, drowsiness, dry mouth, difficulty urinating, and blurred vision. Hydroxyzine Pregnancy And Lactation Text: This medication is not safe during pregnancy and should not be taken. It is also excreted in breast milk and breast feeding isn't recommended. Clofazimine Pregnancy And Lactation Text: This medication is Pregnancy Category C and isn't considered safe during pregnancy. It is excreted in breast milk. Isotretinoin Pregnancy And Lactation Text: This medication is Pregnancy Category X and is considered extremely dangerous during pregnancy. It is unknown if it is excreted in breast milk. Simponi Counseling:  I discussed with the patient the risks of golimumab including but not limited to myelosuppression, immunosuppression, autoimmune hepatitis, demyelinating diseases, lymphoma, and serious infections.  The patient understands that monitoring is required including a PPD at baseline and must alert us or the primary physician if symptoms of infection or other concerning signs are noted. Metronidazole Counseling:  I discussed with the patient the risks of metronidazole including but not limited to seizures, nausea/vomiting, a metallic taste in the mouth, nausea/vomiting and severe allergy. Otezla Counseling: The side effects of Otezla were discussed with the patient, including but not limited to worsening or new depression, weight loss, diarrhea, nausea, upper respiratory tract infection, and headache. Patient instructed to call the office should any adverse effect occur.  The patient verbalized understanding of the proper use and possible adverse effects of Otezla.  All the patient's questions and concerns were addressed. Isotretinoin Counseling: Patient should get monthly blood tests, not donate blood, not drive at night if vision affected, not share medication, and not undergo elective surgery for 6 months after tx completed. Side effects reviewed, pt to contact office should one occur. Cyclophosphamide Counseling:  I discussed with the patient the risks of cyclophosphamide including but not limited to hair loss, hormonal abnormalities, decreased fertility, abdominal pain, diarrhea, nausea and vomiting, bone marrow suppression and infection. The patient understands that monitoring is required while taking this medication. Siliq Pregnancy And Lactation Text: The risk during pregnancy and breastfeeding is uncertain with this medication. Tetracycline Pregnancy And Lactation Text: This medication is Pregnancy Category D and not consider safe during pregnancy. It is also excreted in breast milk. Solaraze Counseling:  I discussed with the patient the risks of Solaraze including but not limited to erythema, scaling, itching, weeping, crusting, and pain. Sski Pregnancy And Lactation Text: This medication is Pregnancy Category D and isn't considered safe during pregnancy. It is excreted in breast milk. 5-Fu Counseling: 5-Fluorouracil Counseling:  I discussed with the patient the risks of 5-fluorouracil including but not limited to erythema, scaling, itching, weeping, crusting, and pain. Cimzia Counseling:  I discussed with the patient the risks of Cimzia including but not limited to immunosuppression, allergic reactions and infections.  The patient understands that monitoring is required including a PPD at baseline and must alert us or the primary physician if symptoms of infection or other concerning signs are noted. Ketoconazole Pregnancy And Lactation Text: This medication is Pregnancy Category C and it isn't know if it is safe during pregnancy. It is also excreted in breast milk and breast feeding isn't recommended. Xeljanz Counseling: I discussed with the patient the risks of Xeljanz therapy including increased risk of infection, liver issues, headache, diarrhea, or cold symptoms. Live vaccines should be avoided. They were instructed to call if they have any problems. Cimzia Pregnancy And Lactation Text: This medication crosses the placenta but can be considered safe in certain situations. Cimzia may be excreted in breast milk. High Dose Vitamin A Counseling: Side effects reviewed, pt to contact office should one occur. Terbinafine Counseling: Patient counseling regarding adverse effects of terbinafine including but not limited to headache, diarrhea, rash, upset stomach, liver function test abnormalities, itching, taste/smell disturbance, nausea, abdominal pain, and flatulence.  There is a rare possibility of liver failure that can occur when taking terbinafine.  The patient understands that a baseline LFT and kidney function test may be required. The patient verbalized understanding of the proper use and possible adverse effects of terbinafine.  All of the patient's questions and concerns were addressed. Colchicine Counseling:  Patient counseled regarding adverse effects including but not limited to stomach upset (nausea, vomiting, stomach pain, or diarrhea).  Patient instructed to limit alcohol consumption while taking this medication.  Colchicine may reduce blood counts especially with prolonged use.  The patient understands that monitoring of kidney function and blood counts may be required, especially at baseline. The patient verbalized understanding of the proper use and possible adverse effects of colchicine.  All of the patient's questions and concerns were addressed. Minocycline Counseling: Patient advised regarding possible photosensitivity and discoloration of the teeth, skin, lips, tongue and gums.  Patient instructed to avoid sunlight, if possible.  When exposed to sunlight, patients should wear protective clothing, sunglasses, and sunscreen.  The patient was instructed to call the office immediately if the following severe adverse effects occur:  hearing changes, easy bruising/bleeding, severe headache, or vision changes.  The patient verbalized understanding of the proper use and possible adverse effects of minocycline.  All of the patient's questions and concerns were addressed. Xolair Counseling:  Patient informed of potential adverse effects including but not limited to fever, muscle aches, rash and allergic reactions.  The patient verbalized understanding of the proper use and possible adverse effects of Xolair.  All of the patient's questions and concerns were addressed. Prednisone Counseling:  I discussed with the patient the risks of prolonged use of prednisone including but not limited to weight gain, insomnia, osteoporosis, mood changes, diabetes, susceptibility to infection, glaucoma and high blood pressure.  In cases where prednisone use is prolonged, patients should be monitored with blood pressure checks, serum glucose levels and an eye exam.  Additionally, the patient may need to be placed on GI prophylaxis, PCP prophylaxis, and calcium and vitamin D supplementation and/or a bisphosphonate.  The patient verbalized understanding of the proper use and the possible adverse effects of prednisone.  All of the patient's questions and concerns were addressed. Glycopyrrolate Pregnancy And Lactation Text: This medication is Pregnancy Category B and is considered safe during pregnancy. It is unknown if it is excreted breast milk. Drysol Counseling:  I discussed with the patient the risks of drysol/aluminum chloride including but not limited to skin rash, itching, irritation, burning. 5-Fu Pregnancy And Lactation Text: This medication is Pregnancy Category X and contraindicated in pregnancy and in women who may become pregnant. It is unknown if this medication is excreted in breast milk. Cephalexin Counseling: I counseled the patient regarding use of cephalexin as an antibiotic for prophylactic and/or therapeutic purposes. Cephalexin (commonly prescribed under brand name Keflex) is a cephalosporin antibiotic which is active against numerous classes of bacteria, including most skin bacteria. Side effects may include nausea, diarrhea, gastrointestinal upset, rash, hives, yeast infections, and in rare cases, hepatitis, kidney disease, seizures, fever, confusion, neurologic symptoms, and others. Patients with severe allergies to penicillin medications are cautioned that there is about a 10% incidence of cross-reactivity with cephalosporins. When possible, patients with penicillin allergies should use alternatives to cephalosporins for antibiotic therapy. Xelmarcelloz Pregnancy And Lactation Text: This medication is Pregnancy Category D and is not considered safe during pregnancy.  The risk during breast feeding is also uncertain. Solaraze Pregnancy And Lactation Text: This medication is Pregnancy Category B and is considered safe. There is some data to suggest avoiding during the third trimester. It is unknown if this medication is excreted in breast milk. Metronidazole Pregnancy And Lactation Text: This medication is Pregnancy Category B and considered safe during pregnancy.  It is also excreted in breast milk. Thalidomide Counseling: I discussed with the patient the risks of thalidomide including but not limited to birth defects, anxiety, weakness, chest pain, dizziness, cough and severe allergy. Imiquimod Counseling:  I discussed with the patient the risks of imiquimod including but not limited to erythema, scaling, itching, weeping, crusting, and pain.  Patient understands that the inflammatory response to imiquimod is variable from person to person and was educated regarded proper titration schedule.  If flu-like symptoms develop, patient knows to discontinue the medication and contact us. Ilumya Counseling: I discussed with the patient the risks of tildrakizumab including but not limited to immunosuppression, malignancy, posterior leukoencephalopathy syndrome, and serious infections.  The patient understands that monitoring is required including a PPD at baseline and must alert us or the primary physician if symptoms of infection or other concerning signs are noted. Otezla Pregnancy And Lactation Text: This medication is Pregnancy Category C and it isn't known if it is safe during pregnancy. It is unknown if it is excreted in breast milk. Cyclophosphamide Pregnancy And Lactation Text: This medication is Pregnancy Category D and it isn't considered safe during pregnancy. This medication is excreted in breast milk. Wartpeel Counseling:  I discussed with the patient the risks of Wartpeel including but not limited to erythema, scaling, itching, weeping, crusting, and pain. Xolair Pregnancy And Lactation Text: This medication is Pregnancy Category B and is considered safe during pregnancy. This medication is excreted in breast milk. Prednisone Pregnancy And Lactation Text: This medication is Pregnancy Category C and it isn't know if it is safe during pregnancy. This medication is excreted in breast milk. Hydroxychloroquine Counseling:  I discussed with the patient that a baseline ophthalmologic exam is needed at the start of therapy and every year thereafter while on therapy. A CBC may also be warranted for monitoring.  The side effects of this medication were discussed with the patient, including but not limited to agranulocytosis, aplastic anemia, seizures, rashes, retinopathy, and liver toxicity. Patient instructed to call the office should any adverse effect occur.  The patient verbalized understanding of the proper use and possible adverse effects of Plaquenil.  All the patient's questions and concerns were addressed. Fluconazole Pregnancy And Lactation Text: This medication is Pregnancy Category C and it isn't know if it is safe during pregnancy. It is also excreted in breast milk. Drysol Pregnancy And Lactation Text: This medication is considered safe during pregnancy and breast feeding. Stelara Counseling:  I discussed with the patient the risks of ustekinumab including but not limited to immunosuppression, malignancy, posterior leukoencephalopathy syndrome, and serious infections.  The patient understands that monitoring is required including a PPD at baseline and must alert us or the primary physician if symptoms of infection or other concerning signs are noted. Minoxidil Counseling: Minoxidil is a topical medication which can increase blood flow where it is applied. It is uncertain how this medication increases hair growth. Side effects are uncommon and include stinging and allergic reactions. Clindamycin Counseling: I counseled the patient regarding use of clindamycin as an antibiotic for prophylactic and/or therapeutic purposes. Clindamycin is active against numerous classes of bacteria, including skin bacteria. Side effects may include nausea, diarrhea, gastrointestinal upset, rash, hives, yeast infections, and in rare cases, colitis. Oxybutynin Pregnancy And Lactation Text: This medication is Pregnancy Category B and is considered safe during pregnancy. It is unknown if it is excreted in breast milk. High Dose Vitamin A Pregnancy And Lactation Text: High dose vitamin A therapy is contraindicated during pregnancy and breast feeding. Oxybutynin Counseling:  I discussed with the patient the risks of oxybutynin including but not limited to skin rash, drowsiness, dry mouth, difficulty urinating, and blurred vision. Albendazole Counseling:  I discussed with the patient the risks of albendazole including but not limited to cytopenia, kidney damage, nausea/vomiting and severe allergy.  The patient understands that this medication is being used in an off-label manner. Cephalexin Pregnancy And Lactation Text: This medication is Pregnancy Category B and considered safe during pregnancy.  It is also excreted in breast milk but can be used safely for shorter doses. Fluconazole Counseling:  Patient counseled regarding adverse effects of fluconazole including but not limited to headache, diarrhea, nausea, upset stomach, liver function test abnormalities, taste disturbance, and stomach pain.  There is a rare possibility of liver failure that can occur when taking fluconazole.  The patient understands that monitoring of LFTs and kidney function test may be required, especially at baseline. The patient verbalized understanding of the proper use and possible adverse effects of fluconazole.  All of the patient's questions and concerns were addressed. Imiquimod Pregnancy And Lactation Text: This medication is Pregnancy Category C. It is unknown if this medication is excreted in breast milk. Thalidomide Pregnancy And Lactation Text: This medication is Pregnancy Category X and is absolutely contraindicated during pregnancy. It is unknown if it is excreted in breast milk. Cosentyx Counseling:  I discussed with the patient the risks of Cosentyx including but not limited to worsening of Crohn's disease, immunosuppression, allergic reactions and infections.  The patient understands that monitoring is required including a PPD at baseline and must alert us or the primary physician if symptoms of infection or other concerning signs are noted. Terbinafine Pregnancy And Lactation Text: This medication is Pregnancy Category B and is considered safe during pregnancy. It is also excreted in breast milk and breast feeding isn't recommended. Topical Retinoid counseling:  Patient advised to apply a pea-sized amount only at bedtime and wait 30 minutes after washing their face before applying.  If too drying, patient may add a non-comedogenic moisturizer. The patient verbalized understanding of the proper use and possible adverse effects of retinoids.  All of the patient's questions and concerns were addressed. Clindamycin Pregnancy And Lactation Text: This medication can be used in pregnancy if certain situations. Clindamycin is also present in breast milk. Hydroxychloroquine Pregnancy And Lactation Text: This medication has been shown to cause fetal harm but it isn't assigned a Pregnancy Risk Category. There are small amounts excreted in breast milk. Birth Control Pills Counseling: Birth Control Pill Counseling: I discussed with the patient the potential side effects of OCPs including but not limited to increased risk of stroke, heart attack, thrombophlebitis, deep venous thrombosis, hepatic adenomas, breast changes, GI upset, headaches, and depression.  The patient verbalized understanding of the proper use and possible adverse effects of OCPs. All of the patient's questions and concerns were addressed. Quinolones Counseling:  I discussed with the patient the risks of fluoroquinolones including but not limited to GI upset, allergic reaction, drug rash, diarrhea, dizziness, photosensitivity, yeast infections, liver function test abnormalities, tendonitis/tendon rupture. Elidel Counseling: Patient may experience a mild burning sensation during topical application. Elidel is not approved in children less than 2 years of age. There have been case reports of hematologic and skin malignancies in patients using topical calcineurin inhibitors although causality is questionable. Griseofulvin Counseling:  I discussed with the patient the risks of griseofulvin including but not limited to photosensitivity, cytopenia, liver damage, nausea/vomiting and severe allergy.  The patient understands that this medication is best absorbed when taken with a fatty meal (e.g., ice cream or french fries). Zyclara Counseling:  I discussed with the patient the risks of imiquimod including but not limited to erythema, scaling, itching, weeping, crusting, and pain.  Patient understands that the inflammatory response to imiquimod is variable from person to person and was educated regarded proper titration schedule.  If flu-like symptoms develop, patient knows to discontinue the medication and contact us. Valtrex Counseling: I discussed with the patient the risks of valacyclovir including but not limited to kidney damage, nausea, vomiting and severe allergy.  The patient understands that if the infection seems to be worsening or is not improving, they are to call. Infliximab Counseling:  I discussed with the patient the risks of infliximab including but not limited to myelosuppression, immunosuppression, autoimmune hepatitis, demyelinating diseases, lymphoma, and serious infections.  The patient understands that monitoring is required including a PPD at baseline and must alert us or the primary physician if symptoms of infection or other concerning signs are noted. Dapsone Counseling: I discussed with the patient the risks of dapsone including but not limited to hemolytic anemia, agranulocytosis, rashes, methemoglobinemia, kidney failure, peripheral neuropathy, headaches, GI upset, and liver toxicity.  Patients who start dapsone require monitoring including baseline LFTs and weekly CBCs for the first month, then every month thereafter.  The patient verbalized understanding of the proper use and possible adverse effects of dapsone.  All of the patient's questions and concerns were addressed. Detail Level: Zone Albendazole Pregnancy And Lactation Text: This medication is Pregnancy Category C and it isn't known if it is safe during pregnancy. It is also excreted in breast milk. Benzoyl Peroxide Counseling: Patient counseled that medicine may cause skin irritation and bleach clothing.  In the event of skin irritation, the patient was advised to reduce the amount of the drug applied or use it less frequently.   The patient verbalized understanding of the proper use and possible adverse effects of benzoyl peroxide.  All of the patient's questions and concerns were addressed. Birth Control Pills Pregnancy And Lactation Text: This medication should be avoided if pregnant and for the first 30 days post-partum. Taltz Counseling: I discussed with the patient the risks of ixekizumab including but not limited to immunosuppression, serious infections, worsening of inflammatory bowel disease and drug reactions.  The patient understands that monitoring is required including a PPD at baseline and must alert us or the primary physician if symptoms of infection or other concerning signs are noted. Picato Counseling:  I discussed with the patient the risks of Picato including but not limited to erythema, scaling, itching, weeping, crusting, and pain. Doxycycline Counseling:  Patient counseled regarding possible photosensitivity and increased risk for sunburn.  Patient instructed to avoid sunlight, if possible.  When exposed to sunlight, patients should wear protective clothing, sunglasses, and sunscreen.  The patient was instructed to call the office immediately if the following severe adverse effects occur:  hearing changes, easy bruising/bleeding, severe headache, or vision changes.  The patient verbalized understanding of the proper use and possible adverse effects of doxycycline.  All of the patient's questions and concerns were addressed. Use Enhanced Medication Counseling?: No Griseofulvin Pregnancy And Lactation Text: This medication is Pregnancy Category X and is known to cause serious birth defects. It is unknown if this medication is excreted in breast milk but breast feeding should be avoided. Dupixent Pregnancy And Lactation Text: This medication likely crosses the placenta but the risk for the fetus is uncertain. This medication is excreted in breast milk. Azathioprine Counseling:  I discussed with the patient the risks of azathioprine including but not limited to myelosuppression, immunosuppression, hepatotoxicity, lymphoma, and infections.  The patient understands that monitoring is required including baseline LFTs, Creatinine, possible TPMP genotyping and weekly CBCs for the first month and then every 2 weeks thereafter.  The patient verbalized understanding of the proper use and possible adverse effects of azathioprine.  All of the patient's questions and concerns were addressed. Tazorac Pregnancy And Lactation Text: This medication is not safe during pregnancy. It is unknown if this medication is excreted in breast milk. Ivermectin Counseling:  Patient instructed to take medication on an empty stomach with a full glass of water.  Patient informed of potential adverse effects including but not limited to nausea, diarrhea, dizziness, itching, and swelling of the extremities or lymph nodes.  The patient verbalized understanding of the proper use and possible adverse effects of ivermectin.  All of the patient's questions and concerns were addressed. Tazorac Counseling:  Patient advised that medication is irritating and drying.  Patient may need to apply sparingly and wash off after an hour before eventually leaving it on overnight.  The patient verbalized understanding of the proper use and possible adverse effects of tazorac.  All of the patient's questions and concerns were addressed. Valtrex Pregnancy And Lactation Text: this medication is Pregnancy Category B and is considered safe during pregnancy. This medication is not directly found in breast milk but it's metabolite acyclovir is present. Dupixent Counseling: I discussed with the patient the risks of dupilumab including but not limited to eye infection and irritation, cold sores, injection site reactions, worsening of asthma, allergic reactions and increased risk of parasitic infection.  Live vaccines should be avoided while taking dupilumab. Dupilumab will also interact with certain medications such as warfarin and cyclosporine. The patient understands that monitoring is required and they must alert us or the primary physician if symptoms of infection or other concerning signs are noted. Cimetidine Counseling:  I discussed with the patient the risks of Cimetidine including but not limited to gynecomastia, headache, diarrhea, nausea, drowsiness, arrhythmias, pancreatitis, skin rashes, psychosis, bone marrow suppression and kidney toxicity. Dapsone Pregnancy And Lactation Text: This medication is Pregnancy Category C and is not considered safe during pregnancy or breast feeding. Nsaids Counseling: NSAID Counseling: I discussed with the patient that NSAIDs should be taken with food. Prolonged use of NSAIDs can result in the development of stomach ulcers.  Patient advised to stop taking NSAIDs if abdominal pain occurs.  The patient verbalized understanding of the proper use and possible adverse effects of NSAIDs.  All of the patient's questions and concerns were addressed. Acitretin Counseling:  I discussed with the patient the risks of acitretin including but not limited to hair loss, dry lips/skin/eyes, liver damage, hyperlipidemia, depression/suicidal ideation, photosensitivity.  Serious rare side effects can include but are not limited to pancreatitis, pseudotumor cerebri, bony changes, clot formation/stroke/heart attack.  Patient understands that alcohol is contraindicated since it can result in liver toxicity and significantly prolong the elimination of the drug by many years. Nsaids Pregnancy And Lactation Text: These medications are considered safe up to 30 weeks gestation. It is excreted in breast milk. Doxepin Counseling:  Patient advised that the medication is sedating and not to drive a car after taking this medication. Patient informed of potential adverse effects including but not limited to dry mouth, urinary retention, and blurry vision.  The patient verbalized understanding of the proper use and possible adverse effects of doxepin.  All of the patient's questions and concerns were addressed. Azathioprine Pregnancy And Lactation Text: This medication is Pregnancy Category D and isn't considered safe during pregnancy. It is unknown if this medication is excreted in breast milk. Topical Clindamycin Counseling: Patient counseled that this medication may cause skin irritation or allergic reactions.  In the event of skin irritation, the patient was advised to reduce the amount of the drug applied or use it less frequently.   The patient verbalized understanding of the proper use and possible adverse effects of clindamycin.  All of the patient's questions and concerns were addressed. Rifampin Counseling: I discussed with the patient the risks of rifampin including but not limited to liver damage, kidney damage, red-orange body fluids, nausea/vomiting and severe allergy. Eucrisa Counseling: Patient may experience a mild burning sensation during topical application. Eucrisa is not approved in children less than 2 years of age. Spironolactone Counseling: Patient advised regarding risks of diarrhea, abdominal pain, hyperkalemia, birth defects (for female patients), liver toxicity and renal toxicity. The patient may need blood work to monitor liver and kidney function and potassium levels while on therapy. The patient verbalized understanding of the proper use and possible adverse effects of spironolactone.  All of the patient's questions and concerns were addressed. Itraconazole Counseling:  I discussed with the patient the risks of itraconazole including but not limited to liver damage, nausea/vomiting, neuropathy, and severe allergy.  The patient understands that this medication is best absorbed when taken with acidic beverages such as non-diet cola or ginger ale.  The patient understands that monitoring is required including baseline LFTs and repeat LFTs at intervals.  The patient understands that they are to contact us or the primary physician if concerning signs are noted. Arava Counseling:  Patient counseled regarding adverse effects of Arava including but not limited to nausea, vomiting, abnormalities in liver function tests. Patients may develop mouth sores, rash, diarrhea, and abnormalities in blood counts. The patient understands that monitoring is required including LFTs and blood counts.  There is a rare possibility of scarring of the liver and lung problems that can occur when taking methotrexate. Persistent nausea, loss of appetite, pale stools, dark urine, cough, and shortness of breath should be reported immediately. Patient advised to discontinue Arava treatment and consult with a physician prior to attempting conception. The patient will have to undergo a treatment to eliminate Arava from the body prior to conception. Rituxan Counseling:  I discussed with the patient the risks of Rituxan infusions. Side effects can include infusion reactions, severe drug rashes including mucocutaneous reactions, reactivation of latent hepatitis and other infections and rarely progressive multifocal leukoencephalopathy.  All of the patient's questions and concerns were addressed. Acitretin Pregnancy And Lactation Text: This medication is Pregnancy Category X and should not be given to women who are pregnant or may become pregnant in the future. This medication is excreted in breast milk. Azithromycin Counseling:  I discussed with the patient the risks of azithromycin including but not limited to GI upset, allergic reaction, drug rash, diarrhea, and yeast infections. Erivedge Counseling- I discussed with the patient the risks of Erivedge including but not limited to nausea, vomiting, diarrhea, constipation, weight loss, changes in the sense of taste, decreased appetite, muscle spasms, and hair loss.  The patient verbalized understanding of the proper use and possible adverse effects of Erivedge.  All of the patient's questions and concerns were addressed. Doxycycline Pregnancy And Lactation Text: This medication is Pregnancy Category D and not consider safe during pregnancy. It is also excreted in breast milk but is considered safe for shorter treatment courses. Benzoyl Peroxide Pregnancy And Lactation Text: This medication is Pregnancy Category C. It is unknown if benzoyl peroxide is excreted in breast milk. Erythromycin Counseling:  I discussed with the patient the risks of erythromycin including but not limited to GI upset, allergic reaction, drug rash, diarrhea, increase in liver enzymes, and yeast infections. Protopic Counseling: Patient may experience a mild burning sensation during topical application. Protopic is not approved in children less than 2 years of age. There have been case reports of hematologic and skin malignancies in patients using topical calcineurin inhibitors although causality is questionable. Spironolactone Pregnancy And Lactation Text: This medication can cause feminization of the male fetus and should be avoided during pregnancy. The active metabolite is also found in breast milk. Doxepin Pregnancy And Lactation Text: This medication is Pregnancy Category C and it isn't known if it is safe during pregnancy. It is also excreted in breast milk and breast feeding isn't recommended. Tremfya Counseling: I discussed with the patient the risks of guselkumab including but not limited to immunosuppression, serious infections, worsening of inflammatory bowel disease and drug reactions.  The patient understands that monitoring is required including a PPD at baseline and must alert us or the primary physician if symptoms of infection or other concerning signs are noted. Enbrel Counseling:  I discussed with the patient the risks of etanercept including but not limited to myelosuppression, immunosuppression, autoimmune hepatitis, demyelinating diseases, lymphoma, and infections.  The patient understands that monitoring is required including a PPD at baseline and must alert us or the primary physician if symptoms of infection or other concerning signs are noted. Cyclosporine Counseling:  I discussed with the patient the risks of cyclosporine including but not limited to hypertension, gingival hyperplasia,myelosuppression, immunosuppression, liver damage, kidney damage, neurotoxicity, lymphoma, and serious infections. The patient understands that monitoring is required including baseline blood pressure, CBC, CMP, lipid panel and uric acid, and then 1-2 times monthly CMP and blood pressure. Carac Counseling:  I discussed with the patient the risks of Carac including but not limited to erythema, scaling, itching, weeping, crusting, and pain. Cellcept Counseling:  I discussed with the patient the risks of mycophenolate mofetil including but not limited to infection/immunosuppression, GI upset, hypokalemia, hypercholesterolemia, bone marrow suppression, lymphoproliferative disorders, malignancy, GI ulceration/bleed/perforation, colitis, interstitial lung disease, kidney failure, progressive multifocal leukoencephalopathy, and birth defects.  The patient understands that monitoring is required including a baseline creatinine and regular CBC testing. In addition, patient must alert us immediately if symptoms of infection or other concerning signs are noted. Rifampin Pregnancy And Lactation Text: This medication is Pregnancy Category C and it isn't know if it is safe during pregnancy. It is also excreted in breast milk and should not be used if you are breast feeding. Rituxan Pregnancy And Lactation Text: This medication is Pregnancy Category C and it isn't know if it is safe during pregnancy. It is unknown if this medication is excreted in breast milk but similar antibodies are known to be excreted. Bexarotene Counseling:  I discussed with the patient the risks of bexarotene including but not limited to hair loss, dry lips/skin/eyes, liver abnormalities, hyperlipidemia, pancreatitis, depression/suicidal ideation, photosensitivity, drug rash/allergic reactions, hypothyroidism, anemia, leukopenia, infection, cataracts, and teratogenicity.  Patient understands that they will need regular blood tests to check lipid profile, liver function tests, white blood cell count, thyroid function tests and pregnancy test if applicable. Odomzo Counseling- I discussed with the patient the risks of Odomzo including but not limited to nausea, vomiting, diarrhea, constipation, weight loss, changes in the sense of taste, decreased appetite, muscle spasms, and hair loss.  The patient verbalized understanding of the proper use and possible adverse effects of Odomzo.  All of the patient's questions and concerns were addressed. Azithromycin Pregnancy And Lactation Text: This medication is considered safe during pregnancy and is also secreted in breast milk. Hydroxyzine Counseling: Patient advised that the medication is sedating and not to drive a car after taking this medication.  Patient informed of potential adverse effects including but not limited to dry mouth, urinary retention, and blurry vision.  The patient verbalized understanding of the proper use and possible adverse effects of hydroxyzine.  All of the patient's questions and concerns were addressed. Clofazimine Counseling:  I discussed with the patient the risks of clofazimine including but not limited to skin and eye pigmentation, liver damage, nausea/vomiting, gastrointestinal bleeding and allergy. Tetracycline Counseling: Patient counseled regarding possible photosensitivity and increased risk for sunburn.  Patient instructed to avoid sunlight, if possible.  When exposed to sunlight, patients should wear protective clothing, sunglasses, and sunscreen.  The patient was instructed to call the office immediately if the following severe adverse effects occur:  hearing changes, easy bruising/bleeding, severe headache, or vision changes.  The patient verbalized understanding of the proper use and possible adverse effects of tetracycline.  All of the patient's questions and concerns were addressed. Patient understands to avoid pregnancy while on therapy due to potential birth defects. Protopic Pregnancy And Lactation Text: This medication is Pregnancy Category C. It is unknown if this medication is excreted in breast milk when applied topically. Methotrexate Counseling:  Patient counseled regarding adverse effects of methotrexate including but not limited to nausea, vomiting, abnormalities in liver function tests. Patients may develop mouth sores, rash, diarrhea, and abnormalities in blood counts. The patient understands that monitoring is required including LFT's and blood counts.  There is a rare possibility of scarring of the liver and lung problems that can occur when taking methotrexate. Persistent nausea, loss of appetite, pale stools, dark urine, cough, and shortness of breath should be reported immediately. Patient advised to discontinue methotrexate treatment at least three months before attempting to become pregnant.  I discussed the need for folate supplements while taking methotrexate.  These supplements can decrease side effects during methotrexate treatment. The patient verbalized understanding of the proper use and possible adverse effects of methotrexate.  All of the patient's questions and concerns were addressed. Bactrim Pregnancy And Lactation Text: This medication is Pregnancy Category D and is known to cause fetal risk.  It is also excreted in breast milk. Topical Sulfur Applications Counseling: Topical Sulfur Counseling: Patient counseled that this medication may cause skin irritation or allergic reactions.  In the event of skin irritation, the patient was advised to reduce the amount of the drug applied or use it less frequently.   The patient verbalized understanding of the proper use and possible adverse effects of topical sulfur application.  All of the patient's questions and concerns were addressed. Humira Counseling:  I discussed with the patient the risks of adalimumab including but not limited to myelosuppression, immunosuppression, autoimmune hepatitis, demyelinating diseases, lymphoma, and serious infections.  The patient understands that monitoring is required including a PPD at baseline and must alert us or the primary physician if symptoms of infection or other concerning signs are noted. Bactrim Counseling:  I discussed with the patient the risks of sulfa antibiotics including but not limited to GI upset, allergic reaction, drug rash, diarrhea, dizziness, photosensitivity, and yeast infections.  Rarely, more serious reactions can occur including but not limited to aplastic anemia, agranulocytosis, methemoglobinemia, blood dyscrasias, liver or kidney failure, lung infiltrates or desquamative/blistering drug rashes. Siliq Counseling:  I discussed with the patient the risks of Siliq including but not limited to new or worsening depression, suicidal thoughts and behavior, immunosuppression, malignancy, posterior leukoencephalopathy syndrome, and serious infections.  The patient understands that monitoring is required including a PPD at baseline and must alert us or the primary physician if symptoms of infection or other concerning signs are noted. There is also a special program designed to monitor depression which is required with Siliq. Gabapentin Counseling: I discussed with the patient the risks of gabapentin including but not limited to dizziness, somnolence, fatigue and ataxia. Bexarotene Pregnancy And Lactation Text: This medication is Pregnancy Category X and should not be given to women who are pregnant or may become pregnant. This medication should not be used if you are breast feeding. Ketoconazole Counseling:   Patient counseled regarding improving absorption with orange juice.  Adverse effects include but are not limited to breast enlargement, headache, diarrhea, nausea, upset stomach, liver function test abnormalities, taste disturbance, and stomach pain.  There is a rare possibility of liver failure that can occur when taking ketoconazole. The patient understands that monitoring of LFTs may be required, especially at baseline. The patient verbalized understanding of the proper use and possible adverse effects of ketoconazole.  All of the patient's questions and concerns were addressed. Erythromycin Pregnancy And Lactation Text: This medication is Pregnancy Category B and is considered safe during pregnancy. It is also excreted in breast milk. Hydroquinone Counseling:  Patient advised that medication may result in skin irritation, lightening (hypopigmentation), dryness, and burning.  In the event of skin irritation, the patient was advised to reduce the amount of the drug applied or use it less frequently.  Rarely, spots that are treated with hydroquinone can become darker (pseudoochronosis).  Should this occur, patient instructed to stop medication and call the office. The patient verbalized understanding of the proper use and possible adverse effects of hydroquinone.  All of the patient's questions and concerns were addressed. SSKI Counseling:  I discussed with the patient the risks of SSKI including but not limited to thyroid abnormalities, metallic taste, GI upset, fever, headache, acne, arthralgias, paraesthesias, lymphadenopathy, easy bleeding, arrhythmias, and allergic reaction.

## 2019-03-21 ENCOUNTER — HOSPITAL ENCOUNTER (OUTPATIENT)
Dept: MRI IMAGING | Age: 59
Discharge: HOME | End: 2019-03-21
Attending: INTERNAL MEDICINE
Payer: MEDICARE

## 2019-03-21 DIAGNOSIS — R16.0 LIVER MASS: ICD-10-CM

## 2019-03-21 PROCEDURE — 74011636320 HC RX REV CODE- 636/320: Performed by: INTERNAL MEDICINE

## 2019-03-21 PROCEDURE — 74183 MRI ABD W/O CNTR FLWD CNTR: CPT

## 2019-03-21 PROCEDURE — A9575 INJ GADOTERATE MEGLUMI 0.1ML: HCPCS | Performed by: INTERNAL MEDICINE

## 2019-03-21 RX ADMIN — GADOTERATE MEGLUMINE 15 ML: 376.9 INJECTION INTRAVENOUS at 09:50

## 2019-04-06 ENCOUNTER — DOCUMENTATION ONLY (OUTPATIENT)
Dept: HEMATOLOGY | Age: 59
End: 2019-04-06

## 2019-05-01 ENCOUNTER — OFFICE VISIT (OUTPATIENT)
Dept: HEMATOLOGY | Age: 59
End: 2019-05-01

## 2019-05-01 ENCOUNTER — HOSPITAL ENCOUNTER (OUTPATIENT)
Dept: LAB | Age: 59
Discharge: HOME | End: 2019-05-01
Payer: MEDICARE

## 2019-05-01 VITALS
BODY MASS INDEX: 22.76 KG/M2 | SYSTOLIC BLOOD PRESSURE: 161 MMHG | WEIGHT: 159 LBS | HEIGHT: 70 IN | DIASTOLIC BLOOD PRESSURE: 75 MMHG | HEART RATE: 77 BPM | TEMPERATURE: 97.8 F | RESPIRATION RATE: 16 BRPM | OXYGEN SATURATION: 95 %

## 2019-05-01 DIAGNOSIS — B18.2 CHRONIC HEPATITIS C WITHOUT HEPATIC COMA (HCC): ICD-10-CM

## 2019-05-01 DIAGNOSIS — B18.2 CHRONIC HEPATITIS C WITHOUT HEPATIC COMA (HCC): Primary | ICD-10-CM

## 2019-05-01 LAB
ALBUMIN SERPL-MCNC: 3.5 G/DL (ref 3.4–5)
ALBUMIN/GLOB SERPL: 0.9 {RATIO} (ref 0.8–1.7)
ALP SERPL-CCNC: 133 U/L (ref 45–117)
ALT SERPL-CCNC: 25 U/L (ref 16–61)
ANION GAP SERPL CALC-SCNC: 11 MMOL/L (ref 3–18)
AST SERPL-CCNC: 25 U/L (ref 15–37)
BASOPHILS # BLD: 0 K/UL (ref 0–0.1)
BASOPHILS NFR BLD: 1 % (ref 0–2)
BILIRUB DIRECT SERPL-MCNC: 0.1 MG/DL (ref 0–0.2)
BILIRUB SERPL-MCNC: 0.3 MG/DL (ref 0.2–1)
BUN SERPL-MCNC: 54 MG/DL (ref 7–18)
BUN/CREAT SERPL: 8 (ref 12–20)
CALCIUM SERPL-MCNC: 8.4 MG/DL (ref 8.5–10.1)
CHLORIDE SERPL-SCNC: 105 MMOL/L (ref 100–108)
CO2 SERPL-SCNC: 25 MMOL/L (ref 21–32)
CREAT SERPL-MCNC: 6.82 MG/DL (ref 0.6–1.3)
DIFFERENTIAL METHOD BLD: ABNORMAL
EOSINOPHIL # BLD: 0.7 K/UL (ref 0–0.4)
EOSINOPHIL NFR BLD: 14 % (ref 0–5)
ERYTHROCYTE [DISTWIDTH] IN BLOOD BY AUTOMATED COUNT: 16.3 % (ref 11.6–14.5)
GLOBULIN SER CALC-MCNC: 4 G/DL (ref 2–4)
GLUCOSE SERPL-MCNC: 135 MG/DL (ref 74–99)
HCT VFR BLD AUTO: 33.9 % (ref 36–48)
HGB BLD-MCNC: 10.5 G/DL (ref 13–16)
LYMPHOCYTES # BLD: 0.6 K/UL (ref 0.9–3.6)
LYMPHOCYTES NFR BLD: 12 % (ref 21–52)
MCH RBC QN AUTO: 27.7 PG (ref 24–34)
MCHC RBC AUTO-ENTMCNC: 31 G/DL (ref 31–37)
MCV RBC AUTO: 89.4 FL (ref 74–97)
MONOCYTES # BLD: 0.6 K/UL (ref 0.05–1.2)
MONOCYTES NFR BLD: 11 % (ref 3–10)
NEUTS SEG # BLD: 3 K/UL (ref 1.8–8)
NEUTS SEG NFR BLD: 62 % (ref 40–73)
PLATELET # BLD AUTO: 127 K/UL (ref 135–420)
PMV BLD AUTO: 9.4 FL (ref 9.2–11.8)
POTASSIUM SERPL-SCNC: 4.7 MMOL/L (ref 3.5–5.5)
PROT SERPL-MCNC: 7.5 G/DL (ref 6.4–8.2)
RBC # BLD AUTO: 3.79 M/UL (ref 4.7–5.5)
SODIUM SERPL-SCNC: 141 MMOL/L (ref 136–145)
WBC # BLD AUTO: 4.9 K/UL (ref 4.6–13.2)

## 2019-05-01 PROCEDURE — 80076 HEPATIC FUNCTION PANEL: CPT

## 2019-05-01 PROCEDURE — 80048 BASIC METABOLIC PNL TOTAL CA: CPT

## 2019-05-01 PROCEDURE — 85025 COMPLETE CBC W/AUTO DIFF WBC: CPT

## 2019-05-01 PROCEDURE — 87521 HEPATITIS C PROBE&RVRS TRNSC: CPT

## 2019-05-01 PROCEDURE — 36415 COLL VENOUS BLD VENIPUNCTURE: CPT

## 2019-05-01 NOTE — Clinical Note
5/4/19 Patient: Marlena Tillman YOB: 1960 Date of Visit: 5/1/2019 Luli Lowry MD 
[id number] 
[address] [place] [address] VIA Facsimile: [phone] Dear Luli Lowry MD, Thank you for referring Mr. Marlena Tillman to [address] [place] [address] for evaluation. My notes for this consultation are attached. If you have questions, please do not hesitate to call me. I look forward to following your patient along with you. Sincerely, Yolis Basilio MD

## 2019-05-01 NOTE — PROGRESS NOTES
[address] [facility] Mayra Aliyah Ventura MD, Larwance Perl, FAASLD Burnadette Fila, NP Elsa Dubin, AFSANEH Manrique, [facility]   TAMMI Miranda NP [facility] 
  at [address], [address] [place] [address] 22. 
  [phone] FAX: [address] 
  [facility] 
  [address], [address] [facility], [address] 
  [phone] FAX: [phone] Patient Care Team: 
Armen Dickinson MD as PCP - General (Internal Medicine) aTla Aragon MD (Internal Medicine) Xavi Spencer MD (Nephrology) Problem List  Date Reviewed: 5/4/2019 Codes Class Noted Hemangioma of liver ICD-10-CM: D18.03 
ICD-9-CM: 228.04  5/4/2019 Chronic hepatitis C ([facility] ICD-10-CM: B18.2 ICD-9-CM: 070.54  5/7/2018 ESRD (end stage renal disease) on dialysis ([facility] ICD-10-CM: N18.6, Z99.2 ICD-9-CM: 585.6, V45.11  5/7/2018 Type II diabetes mellitus ([facility] ICD-10-CM: E11.9 ICD-9-CM: 250.00  5/7/2018 Hypertension ICD-10-CM: I10 
ICD-9-CM: 401.9  5/7/2018 Eliu Almaraz returns to the [place] [address] for management of chronic HCV. The active problem list, all pertinent past medical history, medications, radiologic findings and laboratory findings related to the liver disorder were reviewed with the patient. The patient is a 62 y.o.  male who tested positive for chronic HCV in 2017 when he started dialysis Since the last office appointment the patient was: 
Hospitalized at [facility] for ascites and abdominal pain which was thought to be due to SBP. Analysis of ascites was negative for SBP. He was formally turned down as a potential kidney transplant candidate by [facility]. He would now like to proceed and treat HCV. The patient has developed the following complications of cirrhosis: recurrent pleural effusion, The patient notes fatigue, The patient has not experienced pain in the right side over the liver, The patient completes all daily activities without any functional limitations. ASSESSMENT AND PLAN: 
Cirrhosis Cirrhosis is secondary to chronic HCV. The diagnosis is based upon laboratory studies, imaging, elastography The patient is on Coumadin and so the CTP and the MELD score are falsely elevated. The CTP is 5. Child class A. The MELD score is 18. All of the increase in the MELD score is dure to ESRD. Chronic HCV Chronic HCV with cirrhrosis. Severity of the liver disease was assessed by laboratory studies, imaging, elastography Liver transaminases are normal.  ALP is normal.  Liver function is normal.  The platelet count is depressed. HCV Treatment The patient has not been treated for HCV. The patient has HCV genotype 2. He is apparently not a candidate for renal transplant. We will therefore proceed with HCV treatment. The patient should be treated with Arthur Degree (glecaprevir and piprentasvir). The SVR is over 98% Screening for Esophageal varices The patient has not had an EGD to screen for varices. Will schedule for EGD to assess for varices and need for banding. Hepatic encephalopathy Overt HE has not developed to date. There is no need for treatment with lactulose and/or Xifaxan at this time. There is no need to restrict dietary protein at this time. Recurrent pleural effusion Unclear if this is from cirrhosis and/or fluid overload from ESRD This is being followed by Pulmonary. Anemia This is due to ESRD on dialysis Thrombocytopenia This is of unclear etiology. This may be secondary to cirrhosis. There is no evidence of overt bleeding. No treatment is required. The platelet count is adequate for the patient to undergo procedures without the need for platelet transfusion or platelet growth factors. Screening for Hepatocellular Carcinoma [facility] screening has recently been performed and does not suggest [facility] The next liver imaging study will be performed in 9/2019. Treatment of other medical problems in patients with chronic liver disease There are no contraindications for the patient to take any medications that are necessary for treatment of other medical issues. Counseling for alcohol in patients with chronic liver disease The patient was counseled regarding alcohol consumption and the effect of alcohol on chronic liver disease. Vaccinations Vaccination for viral hepatitis A is recommended since the patient has no serologic evidence of previous exposure or vaccination with immunity. Vaccination for viral hepatitis B is not needed. The patient has serologic evidence of prior exposure or vaccination with immunity. Routine vaccinations against other bacterial and viral agents can be performed as indicated. Annual flu vaccination should be administered if indicated. ALLERGIES No Known Allergies MEDICATIONS Current Outpatient Medications Medication Sig  
 glecaprevir-pibrentasvir (MAVYRET) 100-40 mg tab Take 3 Tabs by mouth daily. Indications: Chronic Infection of Genotype 2 Hepatitis C Virus  amLODIPine (NORVASC) 5 mg tablet Take 5 mg by mouth daily.  aspirin 81 mg chewable tablet Take 81 mg by mouth daily.  carvedilol (COREG) 25 mg tablet Take 25 mg by mouth two (2) times daily (with meals).  cloNIDine HCl (CATAPRES) 0.2 mg tablet Take  by mouth two (2) times a day.  vit B complx C/folic acid/zinc (DIALYVITE 800 WITH ZINC 15 PO) Take  by mouth.  furosemide (LASIX) 40 mg tablet Take  by mouth daily. Indications: three 40 mg tabs per day.   
 hydrOXYzine pamoate (VISTARIL) 25 mg capsule Take 25 mg by mouth three (3) times daily as needed for Itching.  losartan (COZAAR) 100 mg tablet Take 50 mg by mouth daily.  sevelamer carbonate (RENVELA) 800 mg tab tab Take  by mouth three (3) times daily.  rosuvastatin (CRESTOR) 5 mg tablet Take  by mouth nightly.  SITagliptin (JANUVIA) 50 mg tablet Take 25 mg by mouth daily. No current facility-administered medications for this visit. SYSTEM REVIEW NOT RELATED TO LIVER DISEASE OR REVIEWED ABOVE: 
Constitution systems: Negative for fever, chills, weight gain, weight loss. Eyes: Negative for visual changes. ENT: Negative for sore throat, painful swallowing. Respiratory: Negative for cough, hemoptysis, SOB. Cardiology: Negative for chest pain, palpitations. GI:  Negative for constipation or diarrhea. : Negative for urinary frequency, dysuria, hematuria, nocturia. Skin: Negative for rash. Hematology: Negative for easy bruising, blood clots. Musculo-skelatal: Negative for back pain, muscle pain, weakness. Neurologic: Negative for headaches, dizziness, vertigo, memory problems not related to HE. Psychology: Negative for anxiety, depression. FAMILY HISTORY: 
The father  of lung cancer. The mother  of head and neck cancer. A brother has cirrhosis form HCV and alcohol. SOCIAL HISTORY: 
The patient is . The spouse has not been tested for HCV The patient has 2 stepchildren, and 3 grandchildren. The patient stopped smoking tobacco in 2018. The patient has previously consumed alcohol in excess - up to 1/5 of alcohol per day. The patient has been abstinent from alcohol since 2017. The patient used to work as a . The patient has not worked since . The patient is currently receiving disability. PHYSICAL EXAMINATION: 
Visit Vitals /75 (BP 1 Location: Right arm, BP Patient Position: Sitting) Pulse 77 Temp 97.8 °F (36.6 °C) (Tympanic) Resp 16 Ht 5' 10\" (1.778 m) Wt 159 lb (72.1 kg) SpO2 95% BMI 22.81 kg/m² General: No acute distress. Eyes: Sclera anicteric. ENT: No oral lesions. Thyroid normal. 
Nodes: No adenopathy. Skin: No spider angiomata. No jaundice. No palmar erythema. Respiratory: Lungs clear to auscultation. Cardiovascular: Regular heart rate. No murmurs. No JVD. Abdomen: Soft non-tender. Liver size normal to percussion/palpation. Spleen not palpable. No obvious ascites. Extremities: No edema. No muscle wasting. No gross arthritic changes. Neurologic: Alert and oriented. Cranial nerves grossly intact. No asterixis. LABORATORY STUDIES: 
Liver [place] of [address] & Units 5/7/2018 WBC 4.6 - 13.2 K/uL 5.7 ANC 1.8 - 8.0 K/UL 3.7 HGB 13.0 - 16.0 g/dL 11.3 (L)  - 420 K/uL 126 (L) INR 0.8 - 1.2   1.0 AST 15 - 37 U/L 30 ALT 16 - 61 U/L 26 Alk Phos 45 - 117 U/L 73 Bili, Total 0.2 - 1.0 MG/DL 0.5 Bili, Direct 0.0 - 0.2 MG/DL 0.1 Albumin 3.4 - 5.0 g/dL 3.7 BUN 7.0 - 18 MG/DL 48 (H) Creat 0.6 - 1.3 MG/DL 6.50 (H) Na 136 - 145 mmol/L 141  
K 3.5 - 5.5 mmol/L 4.8 Cl 100 - 108 mmol/L 99 (L)  
CO2 21 - 32 mmol/L 35 (H) Glucose 74 - 99 mg/dL 162 (H) SEROLOGIES: 
11/2017. HBsurface antigen negative, anti-HBcore negative, anti-HBsurface positive Serologies Latest Ref Rng & Units 5/7/2018 Hep A Ab, Total NEGATIVE   NEGATIVE Hep C Genotype  2b Ferritin 8 - 388 NG/ Iron % Saturation % 26  
 
5/2018. HCV RNA Log 7.7 int U 
 
LIVER HISTOLOGY: 
8/2018. Sheer wave elastography performed at [facility]. Mean 19.1 +/- 5.06 kPa. The results suggested a fibrosis level of F4. ENDOSCOPIC PROCEDURES: 
Not available or performed RADIOLOGY: 
8/2018. Ultrasound of liver. Echogenic consistent with cirrhosis. No liver mass lesions. No dilated bile ducts. No ascites. 3/2019. Dynamic MRI liver. Changes consistent with cirrhosis.   Small liver mass consistent with hemangioma in the right lobe. Small cyst in the right lobe. No concerning liver mass lesions. No dilated bile ducts. No bile duct strictures. No ascites. OTHER TESTIN2017. ECHO heart. LVEF 60%. No TR. No PulHTN. Diastolic dysfunction. 2019. ECHO heart. LVEF 60%. No TR. No PulHTN. Diastolic dysfunction. FOLLOW-UP: 
All of the issues listed above in the Assessment and Plan were discussed with the patient. All questions were answered. The patient expressed a clear understanding of the above. [address] [place] [address] in 4 weeks for elastography to review all data and determine the treatment plan. Albert Alexis MD 
[address], [address] [facility], [address] 
[phone] [address] [place] [address]

## 2019-05-01 NOTE — PROGRESS NOTES
John Villela is a 62 y.o. male 1. Have you been to the ER, urgent care clinic or hospitalized since your last visit? YES. Patient has been to [facility] ER since last visit for uncontrollable vomiting. 2. Have you seen or consulted any other health care providers outside of the [address] [place] [address] since your last visit (Include any pap smears or colon screening)? YES Patient has been to pulmonology since last visit. Learning Assessment 5/7/2018 PRIMARY LEARNER Patient BARRIERS PRIMARY LEARNER NONE  
CO-LEARNER CAREGIVER No  
PRIMARY LANGUAGE ENGLISH  
LEARNER PREFERENCE PRIMARY LISTENING  
ANSWERED BY PATIENT  
RELATIONSHIP SELF

## 2019-05-03 LAB
HCV RNA SERPL NAA+PROBE-LOG IU: 7.29 HCV LOG 10IU/ML
HCV RNA SERPL PROBE AMP-ACNC: ABNORMAL HCVIU/ML
HCV RNA SERPL QL NAA+PROBE: POSITIVE

## 2019-05-04 PROBLEM — D18.03 HEMANGIOMA OF LIVER: Status: ACTIVE | Noted: 2019-05-04

## 2019-06-12 ENCOUNTER — HOSPITAL ENCOUNTER (OUTPATIENT)
Dept: LAB | Age: 59
Discharge: HOME | End: 2019-06-12
Payer: MEDICARE

## 2019-06-12 DIAGNOSIS — B18.2 CHRONIC HEPATITIS C WITHOUT HEPATIC COMA (HCC): Primary | ICD-10-CM

## 2019-06-12 DIAGNOSIS — B18.2 CHRONIC HEPATITIS C WITHOUT HEPATIC COMA (HCC): ICD-10-CM

## 2019-06-12 LAB
ALBUMIN SERPL-MCNC: 3.6 G/DL (ref 3.4–5)
ALBUMIN/GLOB SERPL: 1 {RATIO} (ref 0.8–1.7)
ALP SERPL-CCNC: 126 U/L (ref 45–117)
ALT SERPL-CCNC: 22 U/L (ref 16–61)
ANION GAP SERPL CALC-SCNC: 7 MMOL/L (ref 3–18)
AST SERPL-CCNC: 26 U/L (ref 15–37)
BASOPHILS # BLD: 0 K/UL (ref 0–0.1)
BASOPHILS NFR BLD: 1 % (ref 0–2)
BILIRUB DIRECT SERPL-MCNC: 0.3 MG/DL (ref 0–0.2)
BILIRUB SERPL-MCNC: 0.7 MG/DL (ref 0.2–1)
BUN SERPL-MCNC: 49 MG/DL (ref 7–18)
BUN/CREAT SERPL: 9 (ref 12–20)
CALCIUM SERPL-MCNC: 8.6 MG/DL (ref 8.5–10.1)
CHLORIDE SERPL-SCNC: 100 MMOL/L (ref 100–108)
CO2 SERPL-SCNC: 33 MMOL/L (ref 21–32)
CREAT SERPL-MCNC: 5.41 MG/DL (ref 0.6–1.3)
DIFFERENTIAL METHOD BLD: ABNORMAL
EOSINOPHIL # BLD: 0.3 K/UL (ref 0–0.4)
EOSINOPHIL NFR BLD: 6 % (ref 0–5)
ERYTHROCYTE [DISTWIDTH] IN BLOOD BY AUTOMATED COUNT: 21.4 % (ref 11.6–14.5)
GLOBULIN SER CALC-MCNC: 3.7 G/DL (ref 2–4)
GLUCOSE SERPL-MCNC: 147 MG/DL (ref 74–99)
HCT VFR BLD AUTO: 37.1 % (ref 36–48)
HGB BLD-MCNC: 11.6 G/DL (ref 13–16)
LYMPHOCYTES # BLD: 0.4 K/UL (ref 0.9–3.6)
LYMPHOCYTES NFR BLD: 8 % (ref 21–52)
MCH RBC QN AUTO: 29.4 PG (ref 24–34)
MCHC RBC AUTO-ENTMCNC: 31.3 G/DL (ref 31–37)
MCV RBC AUTO: 94.2 FL (ref 74–97)
MONOCYTES # BLD: 0.9 K/UL (ref 0.05–1.2)
MONOCYTES NFR BLD: 18 % (ref 3–10)
NEUTS SEG # BLD: 3.3 K/UL (ref 1.8–8)
NEUTS SEG NFR BLD: 67 % (ref 40–73)
PLATELET # BLD AUTO: 159 K/UL (ref 135–420)
PMV BLD AUTO: 9.3 FL (ref 9.2–11.8)
POTASSIUM SERPL-SCNC: 5 MMOL/L (ref 3.5–5.5)
PROT SERPL-MCNC: 7.3 G/DL (ref 6.4–8.2)
RBC # BLD AUTO: 3.94 M/UL (ref 4.7–5.5)
SODIUM SERPL-SCNC: 140 MMOL/L (ref 136–145)
WBC # BLD AUTO: 4.9 K/UL (ref 4.6–13.2)

## 2019-06-12 PROCEDURE — 80048 BASIC METABOLIC PNL TOTAL CA: CPT

## 2019-06-12 PROCEDURE — 87521 HEPATITIS C PROBE&RVRS TRNSC: CPT

## 2019-06-12 PROCEDURE — 85025 COMPLETE CBC W/AUTO DIFF WBC: CPT

## 2019-06-12 PROCEDURE — 36415 COLL VENOUS BLD VENIPUNCTURE: CPT

## 2019-06-12 PROCEDURE — 80076 HEPATIC FUNCTION PANEL: CPT

## 2019-06-14 LAB
HCV RNA SERPL NAA+PROBE-LOG IU: <1 HCV LOG 10IU/ML
HCV RNA SERPL PROBE AMP-ACNC: <10 HCVIU/ML
HCV RNA SERPL QL NAA+PROBE: POSITIVE

## 2019-08-12 ENCOUNTER — OFFICE VISIT (OUTPATIENT)
Dept: HEMATOLOGY | Age: 59
End: 2019-08-12

## 2019-08-12 ENCOUNTER — HOSPITAL ENCOUNTER (OUTPATIENT)
Dept: LAB | Age: 59
Discharge: HOME | End: 2019-08-12
Payer: MEDICARE

## 2019-08-12 VITALS
HEART RATE: 79 BPM | SYSTOLIC BLOOD PRESSURE: 150 MMHG | WEIGHT: 164 LBS | BODY MASS INDEX: 23.48 KG/M2 | TEMPERATURE: 98.4 F | RESPIRATION RATE: 16 BRPM | HEIGHT: 70 IN | OXYGEN SATURATION: 95 % | DIASTOLIC BLOOD PRESSURE: 92 MMHG

## 2019-08-12 DIAGNOSIS — B18.2 CHRONIC HEPATITIS C WITHOUT HEPATIC COMA (HCC): ICD-10-CM

## 2019-08-12 DIAGNOSIS — B18.2 CHRONIC HEPATITIS C WITHOUT HEPATIC COMA (HCC): Primary | ICD-10-CM

## 2019-08-12 LAB
ALBUMIN SERPL-MCNC: 3.8 G/DL (ref 3.4–5)
ALBUMIN/GLOB SERPL: 1.1 {RATIO} (ref 0.8–1.7)
ALP SERPL-CCNC: 100 U/L (ref 45–117)
ALT SERPL-CCNC: 17 U/L (ref 16–61)
ANION GAP SERPL CALC-SCNC: 8 MMOL/L (ref 3–18)
AST SERPL-CCNC: 17 U/L (ref 10–38)
BASOPHILS # BLD: 0 K/UL (ref 0–0.1)
BASOPHILS NFR BLD: 1 % (ref 0–2)
BILIRUB DIRECT SERPL-MCNC: 0.1 MG/DL (ref 0–0.2)
BILIRUB SERPL-MCNC: 0.4 MG/DL (ref 0.2–1)
BUN SERPL-MCNC: 62 MG/DL (ref 7–18)
BUN/CREAT SERPL: 9 (ref 12–20)
CALCIUM SERPL-MCNC: 8.9 MG/DL (ref 8.5–10.1)
CHLORIDE SERPL-SCNC: 100 MMOL/L (ref 100–111)
CO2 SERPL-SCNC: 32 MMOL/L (ref 21–32)
CREAT SERPL-MCNC: 6.79 MG/DL (ref 0.6–1.3)
DIFFERENTIAL METHOD BLD: ABNORMAL
EOSINOPHIL # BLD: 0.5 K/UL (ref 0–0.4)
EOSINOPHIL NFR BLD: 8 % (ref 0–5)
ERYTHROCYTE [DISTWIDTH] IN BLOOD BY AUTOMATED COUNT: 17.4 % (ref 11.6–14.5)
GLOBULIN SER CALC-MCNC: 3.6 G/DL (ref 2–4)
GLUCOSE SERPL-MCNC: 137 MG/DL (ref 74–99)
HCT VFR BLD AUTO: 37.5 % (ref 36–48)
HGB BLD-MCNC: 11.9 G/DL (ref 13–16)
LYMPHOCYTES # BLD: 0.6 K/UL (ref 0.9–3.6)
LYMPHOCYTES NFR BLD: 10 % (ref 21–52)
MCH RBC QN AUTO: 29.2 PG (ref 24–34)
MCHC RBC AUTO-ENTMCNC: 31.7 G/DL (ref 31–37)
MCV RBC AUTO: 91.9 FL (ref 74–97)
MONOCYTES # BLD: 0.8 K/UL (ref 0.05–1.2)
MONOCYTES NFR BLD: 13 % (ref 3–10)
NEUTS SEG # BLD: 3.9 K/UL (ref 1.8–8)
NEUTS SEG NFR BLD: 68 % (ref 40–73)
PLATELET # BLD AUTO: 139 K/UL (ref 135–420)
PMV BLD AUTO: 9.6 FL (ref 9.2–11.8)
POTASSIUM SERPL-SCNC: 5.3 MMOL/L (ref 3.5–5.5)
PROT SERPL-MCNC: 7.4 G/DL (ref 6.4–8.2)
RBC # BLD AUTO: 4.08 M/UL (ref 4.7–5.5)
SODIUM SERPL-SCNC: 140 MMOL/L (ref 136–145)
WBC # BLD AUTO: 5.8 K/UL (ref 4.6–13.2)

## 2019-08-12 PROCEDURE — 80048 BASIC METABOLIC PNL TOTAL CA: CPT

## 2019-08-12 PROCEDURE — 80076 HEPATIC FUNCTION PANEL: CPT

## 2019-08-12 PROCEDURE — 85025 COMPLETE CBC W/AUTO DIFF WBC: CPT

## 2019-08-12 PROCEDURE — 36415 COLL VENOUS BLD VENIPUNCTURE: CPT

## 2019-08-12 PROCEDURE — 87521 HEPATITIS C PROBE&RVRS TRNSC: CPT

## 2019-08-12 PROCEDURE — 82107 ALPHA-FETOPROTEIN L3: CPT

## 2019-08-12 RX ORDER — MINOXIDIL 2.5 MG/1
2.5 TABLET ORAL 2 TIMES DAILY
Refills: 3 | COMMUNITY
Start: 2019-08-07

## 2019-08-12 NOTE — PROGRESS NOTES
John Comment is a 62 y.o. male      1. Have you been to the ER, urgent care clinic or hospitalized since your last visit? NO.     2. Have you seen or consulted any other health care providers outside of the [address] [place] [address] since your last visit (Include any pap smears or colon screening)?  NO          Learning Assessment 5/7/2018   PRIMARY LEARNER Patient   BARRIERS PRIMARY LEARNER NONE   CO-LEARNER CAREGIVER No   PRIMARY LANGUAGE ENGLISH   LEARNER PREFERENCE PRIMARY LISTENING   ANSWERED BY PATIENT   RELATIONSHIP SELF

## 2019-08-12 NOTE — PROGRESS NOTES
[address] [facility], Niraj SNYDER Red Jakob, MD Garnette Minder, AFSANEH Olguin, [facility]     April S Dinesh, [facility]   BROOKE Saavedra-ABDIFATAH Campos, [facility]       Virgilio Martel Jacky De Price 136    at [address], [address] [place] [address] 22.    [phone]    FAX: [address] [place] [address], [address]    [phone]    FAX: [phone]       Patient Care Team:  Don Gastelum MD as PCP - General (Internal Medicine)  Carrie Perez MD (Internal Medicine)  Jose Hood MD (Nephrology)      Problem List  Date Reviewed: 5/4/2019          Codes Class Noted    Hemangioma of liver ICD-10-CM: D18.03  ICD-9-CM: 228.04  5/4/2019        Chronic hepatitis C ([facility].) ICD-10-CM: B18.2  ICD-9-CM: 070.54  5/7/2018        ESRD (end stage renal disease) on dialysis [facility]) ICD-10-CM: N18.6, Z99.2  ICD-9-CM: 585.6, V45.11  5/7/2018        Type II diabetes mellitus ([facility] ICD-10-CM: E11.9  ICD-9-CM: 250.00  5/7/2018        Hypertension ICD-10-CM: I10  ICD-9-CM: 401.9  5/7/2018              [facility]. The active problem list, all pertinent past medical history, medications, radiologic findings and laboratory findings related to the liver disorder were reviewed with the patient. The patient is a 62 y.o.  male who tested positive for chronic HCV in 2017 when he started dialysis      He was formally turned down as a potential kidney transplant candidate by [facility]. He decided to proceed with HCV treatment. The patient has developed the following complications of cirrhosis: recurrent pleural effusion,     The patient notes fatigue.     The patient has not experienced pain in the right side over the liver,     The patient completes all daily activities without any functional limitations. ASSESSMENT AND PLAN:  Cirrhosis  Cirrhosis is secondary to chronic HCV. The diagnosis is based upon laboratory studies, imaging, elastography  The patient is on Coumadin and so the CTP and the MELD score are falsely elevated. The CTP is 5. Child class A. The MELD score is 18. All of the increase in the MELD score is due to ESRD. Chronic HCV   Chronic HCV with cirrhrosis. Severity of the liver disease was assessed by laboratory studies, imaging, elastography  Liver transaminases are normal.  ALP is normal.  Liver function is normal.  The platelet count is depressed. HCV Treatment  The patient has not been treated for HCV. The patient has HCV genotype 2. He is apparently not a candidate for renal transplant. The patient has completed treatment with Mavyret (glecaprevir and piprentasvir) for 12 weeks. Treatment dates: 5/13/2019-8/5/2019. Screening for Esophageal varices   The patient has not had an EGD to screen for varices. Will schedule for EGD to assess for varices and need for banding. Hepatic encephalopathy   Overt HE has not developed to date. There is no need for treatment with lactulose and/or Xifaxan at this time. There is no need to restrict dietary protein at this time. Recurrent pleural effusion  Unclear if this is from cirrhosis and/or fluid overload from ESRD   This is being followed by Pulmonary. Anemia   This is due to ESRD on dialysis    Thrombocytopenia   This is of unclear etiology. This may be secondary to cirrhosis. There is no evidence of overt bleeding. No treatment is required. The platelet count is adequate for the patient to undergo procedures without the need for platelet transfusion or platelet growth factors.     Screening for Hepatocellular Carcinoma  HCC screening has recently been performed and does not suggest [facility] The next liver imaging study will be performed in 9/2019. AFP ordered today. Treatment of other medical problems in patients with chronic liver disease  There are no contraindications for the patient to take any medications that are necessary for treatment of other medical issues. Hypertension  The patient has ESRD and is scheduled for dialysis tomorrow. Patient states his bp meds are being adjusted by the nephrologist.    Counseling for alcohol in patients with chronic liver disease  The patient was counseled regarding alcohol consumption and the effect of alcohol on chronic liver disease. Vaccinations   Vaccination for viral hepatitis A is recommended since the patient has no serologic evidence of previous exposure or vaccination with immunity. Vaccination for viral hepatitis B is not needed. The patient has serologic evidence of prior exposure or vaccination with immunity. Routine vaccinations against other bacterial and viral agents can be performed as indicated. Annual flu vaccination should be administered if indicated. ALLERGIES  No Known Allergies    MEDICATIONS  Current Outpatient Medications   Medication Sig    minoxidil (LONITEN) 2.5 mg tablet TAKE 2 TABLETS BY MOUTH TWICE DAILY    amLODIPine (NORVASC) 5 mg tablet Take 5 mg by mouth daily.  carvedilol (COREG) 25 mg tablet Take 25 mg by mouth two (2) times daily (with meals).  cloNIDine HCl (CATAPRES) 0.2 mg tablet Take  by mouth two (2) times a day.  vit B complx C/folic acid/zinc (DIALYVITE 800 WITH ZINC 15 PO) Take  by mouth.  furosemide (LASIX) 40 mg tablet Take  by mouth daily. Indications: three 40 mg tabs per day.  losartan (COZAAR) 100 mg tablet Take 50 mg by mouth daily.  sevelamer carbonate (RENVELA) 800 mg tab tab Take  by mouth three (3) times daily.  SITagliptin (JANUVIA) 50 mg tablet Take 25 mg by mouth daily.      No current facility-administered medications for this visit. SYSTEM REVIEW NOT RELATED TO LIVER DISEASE OR REVIEWED ABOVE:  Constitution systems: Negative for fever, chills, weight gain, weight loss. Eyes: Negative for visual changes. ENT: Negative for sore throat, painful swallowing. Respiratory: Negative for cough, hemoptysis, SOB. Cardiology: Negative for chest pain, palpitations. GI:  Negative for constipation or diarrhea. : Negative for urinary frequency, dysuria, hematuria, nocturia. Skin: Negative for rash. Hematology: Negative for easy bruising, blood clots. Musculo-skelatal: Negative for back pain, muscle pain, weakness. Neurologic: Negative for headaches, dizziness, vertigo, memory problems not related to HE. Psychology: Negative for anxiety, depression. FAMILY HISTORY:  The father  of lung cancer. The mother  of head and neck cancer. A brother has cirrhosis from HCV and alcohol. SOCIAL HISTORY:  The patient is . The spouse has not been tested for HCV   The patient has 2 stepchildren, and 3 grandchildren. The patient stopped smoking tobacco in 2018. The patient has previously consumed alcohol in excess - up to 1/5 of alcohol per day. The patient has been abstinent from alcohol since 2017. The patient used to work as a . The patient has not worked since . The patient is currently receiving disability. PHYSICAL EXAMINATION:  Visit Vitals  BP (!) 150/92 (BP 1 Location: Right arm, BP Patient Position: Sitting) Comment: MANUAL   Pulse 79   Temp 98.4 °F (36.9 °C) (Tympanic)   Resp 16   Ht 5' 10\" (1.778 m)   Wt 164 lb (74.4 kg)   SpO2 95%   BMI 23.53 kg/m²     General: No acute distress. Eyes: Sclera anicteric. ENT: No oral lesions. Thyroid normal.  Nodes: No adenopathy. Skin: No spider angiomata. No jaundice. No palmar erythema. Respiratory: Lungs clear to auscultation. Cardiovascular: Regular heart rate. No murmurs. No JVD.   Abdomen: Soft non-tender. Liver size normal to percussion/palpation. Spleen not palpable. No obvious ascites. Extremities: No edema. No muscle wasting. No gross arthritic changes. Neurologic: Alert and oriented. Cranial nerves grossly intact. No asterixis. LABORATORY STUDIES:  [place] [address] & Units 2019   WBC 4.6 - 13.2 K/uL 4.9 4.9   ANC 1.8 - 8.0 K/UL 3.3 3.0   HGB 13.0 - 16.0 g/dL 11.6 (L) 10.5 (L)    - 420 K/uL 159 127 (L)   AST 15 - 37 U/L 26 25   ALT 16 - 61 U/L 22 25   Alk Phos 45 - 117 U/L 126 (H) 133 (H)   Bili, Total 0.2 - 1.0 MG/DL 0.7 0.3   Bili, Direct 0.0 - 0.2 MG/DL 0.3 (H) 0.1   Albumin 3.4 - 5.0 g/dL 3.6 3.5   BUN 7.0 - 18 MG/DL 49 (H) 54 (H)   Creat 0.6 - 1.3 MG/DL 5.41 (H) 6.82 (H)   Na 136 - 145 mmol/L 140 141   K 3.5 - 5.5 mmol/L 5.0 4.7   Cl 100 - 108 mmol/L 100 105   CO2 21 - 32 mmol/L 33 (H) 25   Glucose 74 - 99 mg/dL 147 (H) 135 (H)     SEROLOGIES:  2017. HBsurface antigen negative, anti-HBcore negative, anti-HBsurface positive    Serologies Latest Ref Rng & Units 2018   Hep A Ab, Total NEGATIVE   NEGATIVE   Hep C Genotype  2b   Ferritin 8 - 388 NG/   Iron % Saturation % 26     HCV RNA  2019. 54,568,976  IU/mL  2019. <10  IU/mL    LIVER HISTOLOGY:  2018. Shear wave elastography performed at [facility]. Mean 19.1 +/- 5.06 kPa. The results suggested a fibrosis level of F4. ENDOSCOPIC PROCEDURES:  Not available or performed    RADIOLOGY:  2018. Ultrasound of liver. Echogenic consistent with cirrhosis. No liver mass lesions. No dilated bile ducts. No ascites. 3/2019. Dynamic MRI liver. Changes consistent with cirrhosis. Small liver mass consistent with hemangioma in the right lobe. Small cyst in the right lobe. No concerning liver mass lesions. No dilated bile ducts. No bile duct strictures. No ascites. OTHER TESTIN2017. ECHO heart. LVEF 60%. No TR. No Pul HTN. Diastolic dysfunction. 2019. ECHO heart. LVEF 60%.  No TR.  No Pul HTN. Diastolic dysfunction. FOLLOW-UP:  All of the issues listed above in the Assessment and Plan were discussed with the patient. All questions were answered. The patient expressed a clear understanding of the above. [facility] 87 in 3 months for monitoring.        SHELLIE Rendon-BC  [address] [facility] [place] [address] [address]  [facility], [address]  [phone]

## 2019-08-14 LAB
AFP L3 MFR SERPL: NORMAL % (ref 0–9.9)
AFP SERPL-MCNC: 2.5 NG/ML (ref 0–8)

## 2019-08-16 LAB
HCV RNA SERPL NAA+PROBE-LOG IU: NOT DETECTED HCV LOG 10IU/ML
HCV RNA SERPL PROBE AMP-ACNC: NOT DETECTED HCVIU/ML
HCV RNA SERPL QL NAA+PROBE: NOT DETECTED

## 2021-02-03 ENCOUNTER — HOSPITAL ENCOUNTER (OUTPATIENT)
Dept: GENERAL RADIOLOGY | Age: 61
Discharge: HOME | End: 2021-02-03
Payer: MEDICARE

## 2021-02-03 ENCOUNTER — HOSPITAL ENCOUNTER (OUTPATIENT)
Dept: PREADMISSION TESTING | Age: 61
Discharge: HOME | End: 2021-02-03
Payer: MEDICARE

## 2021-02-03 ENCOUNTER — TRANSCRIBE ORDER (OUTPATIENT)
Dept: REGISTRATION | Age: 61
End: 2021-02-03

## 2021-02-03 VITALS
RESPIRATION RATE: 16 BRPM | HEIGHT: 70 IN | SYSTOLIC BLOOD PRESSURE: 158 MMHG | TEMPERATURE: 98.4 F | DIASTOLIC BLOOD PRESSURE: 73 MMHG | HEART RATE: 89 BPM | BODY MASS INDEX: 22.9 KG/M2 | WEIGHT: 160 LBS

## 2021-02-03 DIAGNOSIS — Z01.812 PRE-PROCEDURE LAB EXAM: Primary | ICD-10-CM

## 2021-02-03 LAB
ALBUMIN SERPL-MCNC: 3.1 G/DL (ref 3.5–5)
ALBUMIN/GLOB SERPL: 0.7 {RATIO} (ref 1.1–2.2)
ALP SERPL-CCNC: 75 U/L (ref 45–117)
ALT SERPL-CCNC: 20 U/L (ref 12–78)
ANION GAP SERPL CALC-SCNC: 13 MMOL/L (ref 5–15)
AST SERPL-CCNC: 19 U/L (ref 15–37)
BASOPHILS # BLD: 0.1 K/UL (ref 0–0.1)
BASOPHILS NFR BLD: 1 % (ref 0–1)
BILIRUB SERPL-MCNC: 0.3 MG/DL (ref 0.2–1)
BUN SERPL-MCNC: 55 MG/DL (ref 6–20)
BUN/CREAT SERPL: 13 (ref 12–20)
CALCIUM SERPL-MCNC: 8.7 MG/DL (ref 8.5–10.1)
CHLORIDE SERPL-SCNC: 100 MMOL/L (ref 97–108)
CO2 SERPL-SCNC: 26 MMOL/L (ref 21–32)
CREAT SERPL-MCNC: 4.38 MG/DL (ref 0.7–1.3)
DIFFERENTIAL METHOD BLD: ABNORMAL
EOSINOPHIL # BLD: 0.3 K/UL (ref 0–0.4)
EOSINOPHIL NFR BLD: 5 % (ref 0–7)
ERYTHROCYTE [DISTWIDTH] IN BLOOD BY AUTOMATED COUNT: 16.1 % (ref 11.5–14.5)
EST. AVERAGE GLUCOSE BLD GHB EST-MCNC: 123 MG/DL
GLOBULIN SER CALC-MCNC: 4.4 G/DL (ref 2–4)
GLUCOSE SERPL-MCNC: 197 MG/DL (ref 65–100)
HBA1C MFR BLD: 5.9 % (ref 4–5.6)
HCT VFR BLD AUTO: 34 % (ref 36.6–50.3)
HGB BLD-MCNC: 10.2 G/DL (ref 12.1–17)
IMM GRANULOCYTES # BLD AUTO: 0 K/UL (ref 0–0.04)
IMM GRANULOCYTES NFR BLD AUTO: 0 % (ref 0–0.5)
LYMPHOCYTES # BLD: 0.4 K/UL (ref 0.8–3.5)
LYMPHOCYTES NFR BLD: 6 % (ref 12–49)
MCH RBC QN AUTO: 27.9 PG (ref 26–34)
MCHC RBC AUTO-ENTMCNC: 30 G/DL (ref 30–36.5)
MCV RBC AUTO: 92.9 FL (ref 80–99)
MONOCYTES # BLD: 0.7 K/UL (ref 0–1)
MONOCYTES NFR BLD: 10 % (ref 5–13)
NEUTS SEG # BLD: 5.1 K/UL (ref 1.8–8)
NEUTS SEG NFR BLD: 78 % (ref 32–75)
NRBC # BLD: 0 K/UL (ref 0–0.01)
NRBC BLD-RTO: 0 PER 100 WBC
PLATELET # BLD AUTO: 164 K/UL (ref 150–400)
PMV BLD AUTO: 9.3 FL (ref 8.9–12.9)
POTASSIUM SERPL-SCNC: 4.5 MMOL/L (ref 3.5–5.1)
PROT SERPL-MCNC: 7.5 G/DL (ref 6.4–8.2)
RBC # BLD AUTO: 3.66 M/UL (ref 4.1–5.7)
RBC MORPH BLD: ABNORMAL
SODIUM SERPL-SCNC: 139 MMOL/L (ref 136–145)
WBC # BLD AUTO: 6.6 K/UL (ref 4.1–11.1)

## 2021-02-03 PROCEDURE — 83036 HEMOGLOBIN GLYCOSYLATED A1C: CPT

## 2021-02-03 PROCEDURE — 80053 COMPREHEN METABOLIC PANEL: CPT

## 2021-02-03 PROCEDURE — 71046 X-RAY EXAM CHEST 2 VIEWS: CPT

## 2021-02-03 PROCEDURE — 93005 ELECTROCARDIOGRAM TRACING: CPT

## 2021-02-03 PROCEDURE — 36415 COLL VENOUS BLD VENIPUNCTURE: CPT

## 2021-02-03 PROCEDURE — 85025 COMPLETE CBC W/AUTO DIFF WBC: CPT

## 2021-02-03 RX ORDER — ACETAMINOPHEN 500 MG
1000 TABLET ORAL
COMMUNITY

## 2021-02-03 RX ORDER — FERRIC CITRATE 210 MG/1
630 TABLET, COATED ORAL
COMMUNITY
End: 2021-02-24

## 2021-02-03 RX ORDER — CLONIDINE HYDROCHLORIDE 0.3 MG/1
0.3 TABLET ORAL 2 TIMES DAILY
COMMUNITY

## 2021-02-03 RX ORDER — ROSUVASTATIN CALCIUM 5 MG/1
5 TABLET, COATED ORAL
COMMUNITY

## 2021-02-03 NOTE — PERIOP NOTES
Patient given surgical site infection FAQs handout and hand hygiene tips sheet. Pre-operative instructions reviewed and patient verbalizes understanding of instructions. Patient has been given the opportunity to ask additional questions. Pt instructed that they will be scheduled for COVID 19 test 4 days prior to surgery. COVID instruction sheet and instructions given to PT. Pt instructed they must self quarantine between  COVID 19 test and day of surgery. Parking deck instructions  given to patient. Instructions reviewed with patient.

## 2021-02-04 LAB
ATRIAL RATE: 89 BPM
CALCULATED P AXIS, ECG09: 43 DEGREES
CALCULATED R AXIS, ECG10: 145 DEGREES
CALCULATED T AXIS, ECG11: 16 DEGREES
DIAGNOSIS, 93000: NORMAL
P-R INTERVAL, ECG05: 154 MS
Q-T INTERVAL, ECG07: 396 MS
QRS DURATION, ECG06: 96 MS
QTC CALCULATION (BEZET), ECG08: 481 MS
VENTRICULAR RATE, ECG03: 89 BPM

## 2021-02-04 NOTE — PERIOP NOTES
CALLED DR. BETTENCOURT OFFICE TO INFORM OF ABNORMAL LABS, SPOKE WITH PABLO. ALL RESULTS FAXED TO OFFICE.

## 2021-02-07 ENCOUNTER — HOSPITAL ENCOUNTER (OUTPATIENT)
Dept: PREADMISSION TESTING | Age: 61
Discharge: HOME | End: 2021-02-07
Payer: MEDICARE

## 2021-02-07 DIAGNOSIS — Z01.812 PRE-PROCEDURE LAB EXAM: ICD-10-CM

## 2021-02-07 PROCEDURE — U0003 INFECTIOUS AGENT DETECTION BY NUCLEIC ACID (DNA OR RNA); SEVERE ACUTE RESPIRATORY SYNDROME CORONAVIRUS 2 (SARS-COV-2) (CORONAVIRUS DISEASE [COVID-19]), AMPLIFIED PROBE TECHNIQUE, MAKING USE OF HIGH THROUGHPUT TECHNOLOGIES AS DESCRIBED BY CMS-2020-01-R: HCPCS

## 2021-02-08 LAB — SARS-COV-2, COV2NT: NOT DETECTED

## 2021-02-19 NOTE — PERIOP NOTES
REACHED OUT TO PATIENT FOR COVID TEST APPT; HE STATED DR. HUGGINS'S OFFICE INFORMED HIM NO NEED FOR ADD'L TESTING. HAD TEST ON 2/7/21 AND HAS QUARANTINED SINCE THEN. SURGERY IS POSTED AS URGENT.

## 2021-02-24 NOTE — PERIOP NOTES
Pre-Operative Instructions    DO NOT EAT OR DRINK ANYTHING AFTER MIDNIGHT THE NIGHT BEFORE SURGERY. PT ADVISED TO NOT WEAR LOTION,POWDER AFTERSHAVE BUT MAY WEAR DEODORANT. ALSO NOT TO WEAR JEWELRY ANYTHING METAL.

## 2021-02-26 ENCOUNTER — ANESTHESIA (OUTPATIENT)
Dept: SURGERY | Age: 61
End: 2021-02-26
Payer: MEDICARE

## 2021-02-26 ENCOUNTER — HOSPITAL ENCOUNTER (OUTPATIENT)
Age: 61
Setting detail: OUTPATIENT SURGERY
Discharge: HOME | End: 2021-02-26
Payer: MEDICARE

## 2021-02-26 ENCOUNTER — ANESTHESIA EVENT (OUTPATIENT)
Dept: SURGERY | Age: 61
End: 2021-02-26
Payer: MEDICARE

## 2021-02-26 VITALS
HEIGHT: 70 IN | WEIGHT: 160.01 LBS | HEART RATE: 62 BPM | RESPIRATION RATE: 18 BRPM | OXYGEN SATURATION: 100 % | DIASTOLIC BLOOD PRESSURE: 70 MMHG | SYSTOLIC BLOOD PRESSURE: 134 MMHG | BODY MASS INDEX: 22.91 KG/M2 | TEMPERATURE: 97.6 F

## 2021-02-26 DIAGNOSIS — Z99.2 ESRD (END STAGE RENAL DISEASE) ON DIALYSIS (HCC): Primary | ICD-10-CM

## 2021-02-26 DIAGNOSIS — N18.6 ESRD (END STAGE RENAL DISEASE) ON DIALYSIS (HCC): Primary | ICD-10-CM

## 2021-02-26 LAB
GLUCOSE BLD STRIP.AUTO-MCNC: 129 MG/DL (ref 65–100)
SERVICE CMNT-IMP: ABNORMAL

## 2021-02-26 PROCEDURE — 76010000149 HC OR TIME 1 TO 1.5 HR

## 2021-02-26 PROCEDURE — 77030040922 HC BLNKT HYPOTHRM STRY -A

## 2021-02-26 PROCEDURE — 77030002987 HC SUT PROL J&J -B

## 2021-02-26 PROCEDURE — 82962 GLUCOSE BLOOD TEST: CPT

## 2021-02-26 PROCEDURE — A4565 SLINGS: HCPCS

## 2021-02-26 PROCEDURE — 77030042556 HC PNCL CAUT -B

## 2021-02-26 PROCEDURE — 77030008462 HC STPLR SKN PROX J&J -A

## 2021-02-26 PROCEDURE — 74011250636 HC RX REV CODE- 250/636: Performed by: NURSE ANESTHETIST, CERTIFIED REGISTERED

## 2021-02-26 PROCEDURE — 2709999900 HC NON-CHARGEABLE SUPPLY

## 2021-02-26 PROCEDURE — 77030040361 HC SLV COMPR DVT MDII -B

## 2021-02-26 PROCEDURE — 77030003601 HC NDL NRV BLK BBMI -A

## 2021-02-26 PROCEDURE — 74011250636 HC RX REV CODE- 250/636: Performed by: ANESTHESIOLOGY

## 2021-02-26 PROCEDURE — 74011000258 HC RX REV CODE- 258: Performed by: NURSE ANESTHETIST, CERTIFIED REGISTERED

## 2021-02-26 PROCEDURE — 76210000016 HC OR PH I REC 1 TO 1.5 HR

## 2021-02-26 PROCEDURE — 76060000033 HC ANESTHESIA 1 TO 1.5 HR

## 2021-02-26 PROCEDURE — 76210000020 HC REC RM PH II FIRST 0.5 HR

## 2021-02-26 PROCEDURE — 74011250636 HC RX REV CODE- 250/636

## 2021-02-26 PROCEDURE — 77030031139 HC SUT VCRL2 J&J -A

## 2021-02-26 RX ORDER — SODIUM CHLORIDE 0.9 % (FLUSH) 0.9 %
5-40 SYRINGE (ML) INJECTION AS NEEDED
Status: DISCONTINUED | OUTPATIENT
Start: 2021-02-26 | End: 2021-02-26 | Stop reason: HOSPADM

## 2021-02-26 RX ORDER — ONDANSETRON 2 MG/ML
4 INJECTION INTRAMUSCULAR; INTRAVENOUS AS NEEDED
Status: DISCONTINUED | OUTPATIENT
Start: 2021-02-26 | End: 2021-02-26 | Stop reason: HOSPADM

## 2021-02-26 RX ORDER — PROPOFOL 10 MG/ML
INJECTION, EMULSION INTRAVENOUS
Status: DISCONTINUED | OUTPATIENT
Start: 2021-02-26 | End: 2021-02-26 | Stop reason: HOSPADM

## 2021-02-26 RX ORDER — MORPHINE SULFATE 10 MG/ML
2 INJECTION, SOLUTION INTRAMUSCULAR; INTRAVENOUS
Status: DISCONTINUED | OUTPATIENT
Start: 2021-02-26 | End: 2021-02-26 | Stop reason: HOSPADM

## 2021-02-26 RX ORDER — HYDROMORPHONE HYDROCHLORIDE 1 MG/ML
0.2 INJECTION, SOLUTION INTRAMUSCULAR; INTRAVENOUS; SUBCUTANEOUS
Status: DISCONTINUED | OUTPATIENT
Start: 2021-02-26 | End: 2021-02-26 | Stop reason: HOSPADM

## 2021-02-26 RX ORDER — HYDROCODONE BITARTRATE AND ACETAMINOPHEN 7.5; 325 MG/1; MG/1
1 TABLET ORAL
Qty: 20 TAB | Refills: 0 | Status: SHIPPED | OUTPATIENT
Start: 2021-02-26 | End: 2021-03-03

## 2021-02-26 RX ORDER — MIDAZOLAM HYDROCHLORIDE 1 MG/ML
0.5 INJECTION, SOLUTION INTRAMUSCULAR; INTRAVENOUS
Status: DISCONTINUED | OUTPATIENT
Start: 2021-02-26 | End: 2021-02-26 | Stop reason: HOSPADM

## 2021-02-26 RX ORDER — DIPHENHYDRAMINE HYDROCHLORIDE 50 MG/ML
12.5 INJECTION, SOLUTION INTRAMUSCULAR; INTRAVENOUS AS NEEDED
Status: DISCONTINUED | OUTPATIENT
Start: 2021-02-26 | End: 2021-02-26 | Stop reason: HOSPADM

## 2021-02-26 RX ORDER — SODIUM CHLORIDE 0.9 % (FLUSH) 0.9 %
5-40 SYRINGE (ML) INJECTION EVERY 8 HOURS
Status: DISCONTINUED | OUTPATIENT
Start: 2021-02-26 | End: 2021-02-26 | Stop reason: HOSPADM

## 2021-02-26 RX ORDER — SODIUM CHLORIDE 9 MG/ML
25 INJECTION, SOLUTION INTRAVENOUS CONTINUOUS
Status: DISCONTINUED | OUTPATIENT
Start: 2021-02-26 | End: 2021-02-26 | Stop reason: HOSPADM

## 2021-02-26 RX ORDER — OXYCODONE AND ACETAMINOPHEN 5; 325 MG/1; MG/1
1 TABLET ORAL AS NEEDED
Status: DISCONTINUED | OUTPATIENT
Start: 2021-02-26 | End: 2021-02-26 | Stop reason: HOSPADM

## 2021-02-26 RX ORDER — FENTANYL CITRATE 50 UG/ML
50 INJECTION, SOLUTION INTRAMUSCULAR; INTRAVENOUS AS NEEDED
Status: DISCONTINUED | OUTPATIENT
Start: 2021-02-26 | End: 2021-02-26 | Stop reason: HOSPADM

## 2021-02-26 RX ORDER — ACETAMINOPHEN 325 MG/1
650 TABLET ORAL ONCE
Status: DISCONTINUED | OUTPATIENT
Start: 2021-02-26 | End: 2021-02-26 | Stop reason: HOSPADM

## 2021-02-26 RX ORDER — SODIUM CHLORIDE 9 MG/ML
INJECTION, SOLUTION INTRAVENOUS
Status: DISCONTINUED | OUTPATIENT
Start: 2021-02-26 | End: 2021-02-26 | Stop reason: HOSPADM

## 2021-02-26 RX ORDER — FENTANYL CITRATE 50 UG/ML
25 INJECTION, SOLUTION INTRAMUSCULAR; INTRAVENOUS
Status: DISCONTINUED | OUTPATIENT
Start: 2021-02-26 | End: 2021-02-26 | Stop reason: HOSPADM

## 2021-02-26 RX ORDER — MIDAZOLAM HYDROCHLORIDE 1 MG/ML
1 INJECTION, SOLUTION INTRAMUSCULAR; INTRAVENOUS AS NEEDED
Status: DISCONTINUED | OUTPATIENT
Start: 2021-02-26 | End: 2021-02-26 | Stop reason: HOSPADM

## 2021-02-26 RX ORDER — PROPOFOL 10 MG/ML
INJECTION, EMULSION INTRAVENOUS AS NEEDED
Status: DISCONTINUED | OUTPATIENT
Start: 2021-02-26 | End: 2021-02-26 | Stop reason: HOSPADM

## 2021-02-26 RX ORDER — SODIUM CHLORIDE, SODIUM LACTATE, POTASSIUM CHLORIDE, CALCIUM CHLORIDE 600; 310; 30; 20 MG/100ML; MG/100ML; MG/100ML; MG/100ML
125 INJECTION, SOLUTION INTRAVENOUS CONTINUOUS
Status: DISCONTINUED | OUTPATIENT
Start: 2021-02-26 | End: 2021-02-26 | Stop reason: HOSPADM

## 2021-02-26 RX ORDER — LIDOCAINE HYDROCHLORIDE 10 MG/ML
0.1 INJECTION, SOLUTION EPIDURAL; INFILTRATION; INTRACAUDAL; PERINEURAL AS NEEDED
Status: DISCONTINUED | OUTPATIENT
Start: 2021-02-26 | End: 2021-02-26 | Stop reason: HOSPADM

## 2021-02-26 RX ADMIN — PROPOFOL 55 MCG/KG/MIN: 10 INJECTION, EMULSION INTRAVENOUS at 07:45

## 2021-02-26 RX ADMIN — FENTANYL CITRATE 50 MCG: 50 INJECTION, SOLUTION INTRAMUSCULAR; INTRAVENOUS at 07:22

## 2021-02-26 RX ADMIN — MIDAZOLAM 1 MG: 1 INJECTION INTRAMUSCULAR; INTRAVENOUS at 07:22

## 2021-02-26 RX ADMIN — PROPOFOL 30 MG: 10 INJECTION, EMULSION INTRAVENOUS at 07:44

## 2021-02-26 RX ADMIN — PROPOFOL 30 MG: 10 INJECTION, EMULSION INTRAVENOUS at 07:58

## 2021-02-26 RX ADMIN — PROPOFOL 20 MG: 10 INJECTION, EMULSION INTRAVENOUS at 07:30

## 2021-02-26 RX ADMIN — MEPIVACAINE HYDROCHLORIDE 30 ML: 15 INJECTION, SOLUTION EPIDURAL; INFILTRATION at 07:31

## 2021-02-26 RX ADMIN — CEFAZOLIN SODIUM 2 G: 10 INJECTION, POWDER, FOR SOLUTION INTRAVENOUS at 07:42

## 2021-02-26 RX ADMIN — SODIUM CHLORIDE: 900 INJECTION, SOLUTION INTRAVENOUS at 07:30

## 2021-02-26 NOTE — DISCHARGE INSTRUCTIONS
Patient Discharge Instructions    Ezekiel Levine / [id number] : 1960    Admitted 2021 Discharged: 2021     Take Home Medications     . · It is important that you take the medication exactly as they are prescribed. · Keep your medication in the bottles provided by the pharmacist and keep a list of the medication names, dosages, and times to be taken in your wallet. · Do not take other medications without consulting your doctor. What to do at Home    Recommended diet: Renal Diet,     Recommended activity: Activity as tolerated,     Additional Instructions: remove left arm dressings on Sun and leave open    Follow-up with Dr Juan A Rivera in 2 weeks, call [phone] for appt        Information obtained by :  I understand that if any problems occur once I am at home I am to contact my physician. I understand and acknowledge receipt of the instructions indicated above. Physician's or R.N.'s Signature                                                                  Date/Time                                                                                                                                              Patient or Representative Signature                                                          Date/Time    Patient Education        Hemodialysis Access Surgery: What to Expect at Home  Your Recovery  Hemodialysis is a way to remove wastes from the blood when your kidneys can no longer do the job. It's not a cure, but it can help you live longer and feel better. It's a lifesaving treatment when you have kidney failure. Hemodialysis is often called dialysis. Your doctor created a place (called an access) in your arm for your blood to flow in and out of your body during your dialysis sessions. Your arm will probably be bruised and swollen.  It may hurt. The cut (incision) may bleed. The pain and bleeding will get better over several days. You will probably need only over-the-counter pain medicine. You can reduce swelling by propping up your arm on 1 or 2 pillows and keeping your elbow straight. You will have stitches. These may dissolve on their own, or your doctor will tell you when to come in to have them removed. You should also be able to return to work in a few days. You may feel some coolness or numbness in your hand. These feelings usually go away in a few weeks. Your doctor may suggest squeezing a soft object. This will strengthen your access and may make hemodialysis faster and easier. You should always be able to feel blood rushing through the fistula or graft. It feels like a slight vibration when you put your fingers on the skin over the fistula or graft. This feeling is called a thrill or a pulse. This care sheet gives you a general idea about how long it will take for you to recover. But each person recovers at a different pace. Follow the steps below to get better as quickly as possible. How can you care for yourself at home? Activity    · Rest when you feel tired. Getting enough sleep will help you recover. Do not lie on or sleep on the arm with the access.     · Avoid activities such as washing windows or gardening that put stress on the arm with the access.     · You may use your arm, but do not lift anything that weighs more than about 15 pounds. This may include a child, heavy grocery bags, a heavy briefcase or backpack, cat litter or dog food bags, or a vacuum .     · You can shower, but keep the access dry for the first 2 days. Cover the area with a plastic bag to keep it dry.     · Do not soak or scrub the incision until it has healed.     · Wear an arm guard to protect the area if you play sports or work with your arms.     · You may drive when your doctor says it is okay.  This is usually in 1 to 2 days.     · Most people are able to return to work about 1 or 2 days after surgery. Diet    · Follow an eating plan that is good for your kidneys. A registered dietitian can help you make a meal plan that is right for you. You may need to limit protein, salt, fluids, and certain foods. Medicines    · Your doctor will tell you if and when you can restart your medicines. He or she will also give you instructions about taking any new medicines.     · If you take aspirin or some other blood thinner, ask your doctor if and when to start taking it again. Make sure that you understand exactly what your doctor wants you to do.     · Take pain medicines exactly as directed. ? If the doctor gave you a prescription medicine for pain, take it as prescribed. ? If you are not taking a prescription pain medicine, ask your doctor if you can take acetaminophen (Tylenol). Do not take ibuprofen (Advil, Motrin) or naproxen (Aleve), or similar medicines, unless your doctor tells you to. They may make chronic kidney disease worse. ? Do not take two or more pain medicines at the same time unless the doctor told you to. Many pain medicines have acetaminophen, which is Tylenol. Too much acetaminophen (Tylenol) can be harmful.     · If you think your pain medicine is making you sick to your stomach:  ? Take your medicine after meals (unless your doctor has told you not to). ? Ask your doctor for a different pain medicine.     · If your doctor prescribed antibiotics, take them as directed. Do not stop taking them just because you feel better. You need to take the full course of antibiotics. Incision care    · Keep the area dry for 2 days. After 2 days, wash the area with soap and water every day, and always before dialysis.     · Do not soak or scrub the incision until it has healed.     · If you have a bandage, change it every day or as your doctor recommends. Your doctor will tell you when you can remove it.    Exercise    · Squeeze a soft ball or other object as your doctor tells you. This will help blood flow through the access and help prevent blood clots. Elevation    · Prop up the sore arm on a pillow anytime you sit or lie down during the next 3 days. Try to keep it above the level of your heart. This will help reduce swelling. Other instructions    · Every day, check your access for a pulse or thrill in the fistula or graft area. A thrill is a vibration. To feel a pulse or thrill, place the first two fingers of your hand over the access.     · Do not bump your arm.     · Do not wear tight clothing, jewelry, or anything else that may squeeze the access.     · Use your other arm to have blood drawn or blood pressure taken.     · Do not put cream or lotion on or near the access.     · Make sure all doctors you deal with know that you have a vascular access. Follow-up care is a key part of your treatment and safety. Be sure to make and go to all appointments, and call your doctor if you are having problems. It's also a good idea to know your test results and keep a list of the medicines you take. When should you call for help? Call 911 anytime you think you may need emergency care. For example, call if:    · You passed out (lost consciousness).     · You have chest pain, are short of breath, or cough up blood. Call your doctor now or seek immediate medical care if:    · Your hand or arm is cold or dark-colored.     · You have no pulse in your access.     · You have nausea or you vomit.     · You have pain that does not get better after you take pain medicine.     · You have loose stitches, or your incision comes open.     · You are bleeding from the incision.     · You have signs of infection, such as:  ? Increased pain, swelling, warmth, or redness. ? Red streaks leading from the area. ? Pus draining from the area.   ? A fever.     · You have signs of a blood clot in your leg (called a deep vein thrombosis), such as:  ? Pain in your calf, back of the knee, thigh, or groin. ? Redness or swelling in your leg. Watch closely for changes in your health, and be sure to contact your doctor if you have any problems. Where can you learn more? Go to http://www.gray.com/  Enter P616 in the search box to learn more about \"Hemodialysis Access Surgery: What to Expect at Home. \"  Current as of: April 15, 2020               Content Version: 12.6   [employer]. Care instructions adapted under license by [employer] (which disclaims liability or warranty for this information). If you have questions about a medical condition or this instruction, always ask your healthcare professional. [place] [address] any warranty or liability for your use of this information. Patient Education        Learning About Being High-Risk for COVID-19  Who is at high risk? COVID-19 causes a mild illness in many people who have it. But certain things may increase your risk for more serious illness. These include:  · Age. The risk increases with age. Older adults are at highest risk. · Smoking. · Obesity. · Living in a long-term care facility. · Having ongoing serious health issues. Some examples are:  ? Chronic lung disease or asthma. ? Heart problems. ? A weakened immune system. ? Cancer treatment. ? Diabetes. ? Chronic kidney disease and having dialysis. ? Sickle cell disease. This is not a complete list. If you have a chronic health problem, ask your doctor if you should take extra precautions during the outbreak. If you are pregnant, it's safest to consider yourself at higher risk. You and your baby may be at risk for pregnancy problems, such as  birth. And you may be at higher risk for serious illness if you get COVID-19. How do you stay safe? · Stay home. ? Stay home as much as you can.  This may be the easiest way to avoid exposure, as long as no one else in your household has the virus.  ? If there are a lot of COVID-19 cases in your community, do not leave your home except to seek medical care. ? Limit visitors right now. It's especially important to avoid contact with anyone who is sick or who might have been exposed. Remember that people may have been exposed without knowing it or having any symptoms. ? Have enough food, medicines, and other supplies on hand so that you don't have to go out. Try some of these options if you don't have what you need. ? Use delivery and takeout services for groceries and meals. ? Have a healthy family member, friend, or neighbor shop for you. ? Ask your doctor for extra prescription medicine. ? Routinely clean and disinfect high-touch surfaces. These include countertops, faucets, door handles, doorknobs, and phones. ? Do not travel. · Wash your hands often and well. ? Wash your hands often, especially after you cough or sneeze. Use soap and water, and scrub for at least 20 seconds. If soap and water aren't available, use an alcohol-based hand . · Be extra careful if you have to go out. ? Avoid crowds and crowded places. Try to keep 6 feet of space between yourself and others. ? Don't use public transportation, ride-shares, or taxis unless you have no choice. ? Try not to touch things that many other people have touched. Door handles, elevator buttons, shopping cart handles, and handrails on escalators get a lot of touches. ? Carry tissues or paper towels with you. If you must touch something, you'll be able to protect your hands. ? Don't shake hands with anyone. Try a friendly wave instead. ? Don't touch your face, and wash your hands often. ? Wash your hands again as soon as you get home. · Know when to call your doctor. ? Call a doctor if you have symptoms of COVID-19 (fever, cough, shortness of breath). If you are told to get testing or care and must go out, wear a cloth face cover. Where can you learn more?   Go to http://www.gray.com/  Enter A131 in the search box to learn more about \"Learning About Being High-Risk for COVID-19. \"  Current as of: December 18, 2020               Content Version: 12.7  © 1242-0416 Healthwise, [facility]. Care instructions adapted under license by [employer] (which disclaims liability or warranty for this information). If you have questions about a medical condition or this instruction, always ask your healthcare professional. [place] [address] any warranty or liability for your use of this information.

## 2021-02-26 NOTE — BRIEF OP NOTE
Brief Postoperative Note    Patient: Dioni Farooq  YOB: 1960  MRN: [id number]    Date of Procedure: 2/26/2021     Pre-Op Diagnosis: END STAGE RENAL DISEASE    Post-Op Diagnosis: Same as preoperative diagnosis.       Procedure(s):  LEFT UPPER ARM BASILIC VEIN TRANSPOSITION FISTULA     Surgeon(s):  Mahsa Hinojosa MD    Surgical Assistant: Surg Asst-1: Bozena Adjutant    Anesthesia: Regional     Estimated Blood Loss (mL): Minimal    Complications: None    Specimens: * No specimens in log *     Implants: * No implants in log *    Drains: * No LDAs found *    Findings: none    Electronically Signed by Itzel Mccrary MD on 2/26/2021 at 8:43 AM

## 2021-02-26 NOTE — ANESTHESIA PROCEDURE NOTES
Peripheral Block    Performed by: Sabine Roque DO  Authorized by: Sabine Roque DO       Pre-procedure:    Indications: at surgeon's request and primary anesthetic    Preanesthetic Checklist: patient identified, risks and benefits discussed, site marked, timeout performed, anesthesia consent given and patient being monitored      Block Type:   Block Type:  Axillary  Laterality:  Left  Monitoring:  Standard ASA monitoring, responsive to questions, continuous pulse ox, oxygen, frequent vital sign checks and heart rate  Injection Technique:  Single shot  Procedures: ultrasound guided    Patient Position: supine  Prep: chlorhexidine    Location:  Axilla  Needle Type:  Stimuplex  Needle Gauge:  22 G  Needle Localization:  Ultrasound guidance  Medication Injected:  Mepivacaine PF (CARBOCAINE) 1.5 % injection, 30 mL    Assessment:  Number of attempts:  1  Injection Assessment:  Incremental injection every 5 mL, negative aspiration for CSF, no paresthesia, no intravascular symptoms, negative aspiration for blood and local visualized surrounding nerve on ultrasound  Patient tolerance:  Patient tolerated the procedure well with no immediate complications

## 2021-02-26 NOTE — ANESTHESIA POSTPROCEDURE EVALUATION
Procedure(s):  LEFT UPPER ARM BASILIC VEIN TRANSPOSITION FISTULA . MAC, regional    Anesthesia Post Evaluation        Patient participation: complete - patient participated  Level of consciousness: awake  Pain management: adequate  Airway patency: patent  Anesthetic complications: no  Cardiovascular status: hemodynamically stable  Respiratory status: acceptable  Hydration status: acceptable  Comments: The patient is ready for PACU discharge. Ruthie Tee DO                   Post anesthesia nausea and vomiting:  controlled      INITIAL Post-op Vital signs:   Vitals Value Taken Time   /70 02/26/21 0915   Temp 36.4 °C (97.6 °F) 02/26/21 0852   Pulse 64 02/26/21 0930   Resp 18 02/26/21 0930   SpO2 100 % 02/26/21 0930   Vitals shown include unvalidated device data.

## 2021-02-26 NOTE — OP NOTES
[facility]  OPERATIVE REPORT    Name:  Luis Antonio Anderson  MR#:  [id number]  :  1960  ACCOUNT #:  [de-identified]  DATE OF SERVICE:  2021      PREOPERATIVE DIAGNOSIS:  Renal failure. POSTOPERATIVE DIAGNOSIS:  Renal failure. PROCEDURE PERFORMED:  Left upper arm basilic vein transposition dialysis fistula. SURGEON:  Aidan Wilkerson MD    ASSISTANT:  Gabriella Felix. ANESTHESIA:  Regional block. COMPLICATIONS:  None. SPECIMENS REMOVED:  None. IMPLANTS:  None. ESTIMATED BLOOD LOSS:  Minimal.    INDICATIONS:  The patient is a 57-year-old male with end-stage renal disease, on hemodialysis. He has had a previous left brachiocephalic fistula which has failed. Vein mapping shows a good left arm basilic vein which will be used for a fistula. PROCEDURE:  The patient's left arm was prepped and draped. A longitudinal incision was made in the distal medial upper arm. The basilic vein was identified and dissected free. A longitudinal incision was then made in the proximal forearm up to the antecubital level. The vein was again identified and dissected free. The proximal radial artery was dissected free. The vein was dissected proximally ligating side branches with silk ties. A second interrupted upper arm incision was made in the proximal medial upper arm. The vein was further dissected free up to the axilla. The vein was then tunneled in the anteromedial upper arm as close to the skin as possible using one counterincision. The vein was then sewn end-to-side to the proximal radial artery using running 6-0 Prolene. Following this, there was good thrill in the fistula. The incisions were closed with Vicryl subcutaneous suture and skin staples. Dressings were applied and the patient was returned to the recovery room in stable condition.         Hilda Wilson MD      [id number]  D:  2021 8:47  T:  2021 10:27  JOB #:  [id number]

## 2021-02-26 NOTE — ANESTHESIA PREPROCEDURE EVALUATION
Relevant Problems   No relevant active problems       Anesthetic History   No history of anesthetic complications            Review of Systems / Medical History  Patient summary reviewed, nursing notes reviewed and pertinent labs reviewed    Pulmonary    COPD            Comments: 2L home 02   Neuro/Psych   Within defined limits           Cardiovascular    Hypertension                   GI/Hepatic/Renal     GERD    Renal disease: ESRD and dialysis       Endo/Other    Diabetes         Other Findings            Physical Exam    Airway  Mallampati: III  TM Distance: < 4 cm  Neck ROM: normal range of motion   Mouth opening: Normal     Cardiovascular  Regular rate and rhythm,  S1 and S2 normal,  no murmur, click, rub, or gallop             Dental    Dentition: Edentulous     Pulmonary  Breath sounds clear to auscultation               Abdominal  GI exam deferred       Other Findings            Anesthetic Plan    ASA: 3  Anesthesia type: MAC and regional - brachial plexus block            Anesthetic plan and risks discussed with: Patient

## (undated) DEVICE — STERILE POLYISOPRENE POWDER-FREE SURGICAL GLOVES WITH EMOLLIENT COATING: Brand: PROTEXIS

## (undated) DEVICE — PREP SKN CHLRAPRP APL 26ML STR --

## (undated) DEVICE — GARMENT,MEDLINE,DVT,INT,CALF,MED, GEN2: Brand: MEDLINE

## (undated) DEVICE — HANDLE LT SNAP ON ULT DURABLE LENS FOR TRUMPF ALC DISPOSABLE

## (undated) DEVICE — SUT PROL 6-0 18IN BV1 DA BLU --

## (undated) DEVICE — SOL INJ SOD CL 0.9% 500ML BG --

## (undated) DEVICE — DRAPE,REIN 53X77,STERILE: Brand: MEDLINE

## (undated) DEVICE — PENCIL SMK EVAC 10 FT BLADE ELECTRD ROCKER FOR TELSCP

## (undated) DEVICE — INFECTION CONTROL KIT SYS

## (undated) DEVICE — SOL IRR STRL H2O 1000ML BTL --

## (undated) DEVICE — STRAP,POSITIONING,KNEE/BODY,FOAM,4X60": Brand: MEDLINE

## (undated) DEVICE — Device

## (undated) DEVICE — SPONGE GZ W4XL4IN COT 12 PLY TYP VII WVN C FLD DSGN

## (undated) DEVICE — REM POLYHESIVE ADULT PATIENT RETURN ELECTRODE: Brand: VALLEYLAB

## (undated) DEVICE — SUTURE VCRL SZ 3-0 L27IN ABSRB UD L26MM SH 1/2 CIR J416H